# Patient Record
Sex: FEMALE | Race: WHITE | NOT HISPANIC OR LATINO | Employment: PART TIME | URBAN - METROPOLITAN AREA
[De-identification: names, ages, dates, MRNs, and addresses within clinical notes are randomized per-mention and may not be internally consistent; named-entity substitution may affect disease eponyms.]

---

## 2021-11-02 ENCOUNTER — TELEPHONE (OUTPATIENT)
Dept: SCHEDULING | Facility: IMAGING CENTER | Age: 51
End: 2021-11-02

## 2021-11-11 ENCOUNTER — HOSPITAL ENCOUNTER (OUTPATIENT)
Dept: RADIOLOGY | Facility: MEDICAL CENTER | Age: 51
End: 2021-11-11
Payer: OTHER GOVERNMENT

## 2021-11-19 ENCOUNTER — OFFICE VISIT (OUTPATIENT)
Dept: MEDICAL GROUP | Facility: MEDICAL CENTER | Age: 51
End: 2021-11-19
Payer: OTHER GOVERNMENT

## 2021-11-19 VITALS
HEART RATE: 76 BPM | BODY MASS INDEX: 28.76 KG/M2 | OXYGEN SATURATION: 98 % | TEMPERATURE: 97.8 F | WEIGHT: 194.2 LBS | HEIGHT: 69 IN | DIASTOLIC BLOOD PRESSURE: 80 MMHG | SYSTOLIC BLOOD PRESSURE: 128 MMHG | RESPIRATION RATE: 16 BRPM

## 2021-11-19 DIAGNOSIS — Z90.710 H/O: HYSTERECTOMY: ICD-10-CM

## 2021-11-19 DIAGNOSIS — Z00.00 PREVENTATIVE HEALTH CARE: ICD-10-CM

## 2021-11-19 DIAGNOSIS — Z12.11 COLON CANCER SCREENING: ICD-10-CM

## 2021-11-19 DIAGNOSIS — N95.1 MENOPAUSAL SYNDROME (HOT FLASHES): ICD-10-CM

## 2021-11-19 DIAGNOSIS — Z12.31 ENCOUNTER FOR SCREENING MAMMOGRAM FOR MALIGNANT NEOPLASM OF BREAST: ICD-10-CM

## 2021-11-19 DIAGNOSIS — R23.2 HOT FLASHES: ICD-10-CM

## 2021-11-19 PROCEDURE — 99214 OFFICE O/P EST MOD 30 MIN: CPT | Performed by: PHYSICIAN ASSISTANT

## 2021-11-19 RX ORDER — ESTRADIOL 0.03 MG/D
1 FILM, EXTENDED RELEASE TRANSDERMAL
Qty: 8 PATCH | Refills: 1 | Status: SHIPPED | OUTPATIENT
Start: 2021-11-19 | End: 2022-01-18 | Stop reason: SDUPTHER

## 2021-11-19 ASSESSMENT — PATIENT HEALTH QUESTIONNAIRE - PHQ9: CLINICAL INTERPRETATION OF PHQ2 SCORE: 0

## 2021-11-19 NOTE — PROGRESS NOTES
"Chief Complaint   Patient presents with   • Establish Care   • Sweats     \"night\"still hormones        HPI  Elva Asher is a 51 y.o. female here today for establishing care.    Patient has a hysterectomy at age of 42  due to fibroids, and has lost one ovary due to ovarian torsion.  Started having vaginal dryness and hot flashes about a year ago.  Has tried Premarin at some point however did not like it due to it being messy for patient.  States hot flashes are not improving after a year, wakes her up almost every night drenched in sweat.            Exam:  /80 (BP Location: Left arm, Patient Position: Sitting)   Pulse 76   Temp 36.6 °C (97.8 °F) (Temporal)   Resp 16   Ht 1.753 m (5' 9\")   Wt 88.1 kg (194 lb 3.2 oz)   SpO2 98%       Constitutional: Alert, oriented in no acute distress.  Psych: Eye contact is good, speech goal directed, affect calm  Eyes: Conjunctiva non-injected, sclera non-icteric.  Lungs: Unlabored respiratory effort, clear to auscultation bilaterally with good excursion, no wheez or rhonci  CV: regular rate and rhythm. No lower extremity edema  Ears:  External ears unremarkable. TMs pearly gray, clear and intact, w/o any perforation or effusion.  Neck subtle, thyroid midline without any nodules, no cervical lymphadenopathy      A/P:  1. H/O: hysterectomy      2. Preventative health care    - CBC WITH DIFFERENTIAL; Future  - Comp Metabolic Panel; Future  - Lipid Profile; Future  - TSH WITH REFLEX TO FT4; Future  - VITAMIN D,25 HYDROXY; Future    3. Encounter for screening mammogram for malignant neoplasm of breast    - MA-SCREENING MAMMO BILAT W/CAD; Future    4. Colon cancer screening    - Referral to GI for Colonoscopy    5. Hot flashes      6. Menopausal syndrome (hot flashes)  - Estradiol 0.025 MG/24HR PATCH BIWEEKLY; Place 1 Patch on the skin two times a week.  Dispense: 8 Patch; Refill: 1        F/U: 8 wks labs and HRT  "

## 2021-12-07 ENCOUNTER — HOSPITAL ENCOUNTER (OUTPATIENT)
Dept: LAB | Facility: MEDICAL CENTER | Age: 51
End: 2021-12-07
Attending: PHYSICIAN ASSISTANT
Payer: OTHER GOVERNMENT

## 2021-12-07 DIAGNOSIS — Z00.00 PREVENTATIVE HEALTH CARE: ICD-10-CM

## 2021-12-07 LAB
ALBUMIN SERPL BCP-MCNC: 4.6 G/DL (ref 3.2–4.9)
ALBUMIN/GLOB SERPL: 2.7 G/DL
ALP SERPL-CCNC: 55 U/L (ref 30–99)
ALT SERPL-CCNC: 17 U/L (ref 2–50)
ANION GAP SERPL CALC-SCNC: 10 MMOL/L (ref 7–16)
AST SERPL-CCNC: 18 U/L (ref 12–45)
BASOPHILS # BLD AUTO: 1.7 % (ref 0–1.8)
BASOPHILS # BLD: 0.07 K/UL (ref 0–0.12)
BILIRUB SERPL-MCNC: 0.5 MG/DL (ref 0.1–1.5)
BUN SERPL-MCNC: 12 MG/DL (ref 8–22)
CALCIUM SERPL-MCNC: 9.5 MG/DL (ref 8.5–10.5)
CHLORIDE SERPL-SCNC: 106 MMOL/L (ref 96–112)
CHOLEST SERPL-MCNC: 222 MG/DL (ref 100–199)
CO2 SERPL-SCNC: 24 MMOL/L (ref 20–33)
CREAT SERPL-MCNC: 0.65 MG/DL (ref 0.5–1.4)
EOSINOPHIL # BLD AUTO: 0.18 K/UL (ref 0–0.51)
EOSINOPHIL NFR BLD: 4.4 % (ref 0–6.9)
ERYTHROCYTE [DISTWIDTH] IN BLOOD BY AUTOMATED COUNT: 43.2 FL (ref 35.9–50)
FASTING STATUS PATIENT QL REPORTED: NORMAL
GLOBULIN SER CALC-MCNC: 1.7 G/DL (ref 1.9–3.5)
GLUCOSE SERPL-MCNC: 99 MG/DL (ref 65–99)
HCT VFR BLD AUTO: 44.9 % (ref 37–47)
HDLC SERPL-MCNC: 55 MG/DL
HGB BLD-MCNC: 15.1 G/DL (ref 12–16)
IMM GRANULOCYTES # BLD AUTO: 0.01 K/UL (ref 0–0.11)
IMM GRANULOCYTES NFR BLD AUTO: 0.2 % (ref 0–0.9)
LDLC SERPL CALC-MCNC: 139 MG/DL
LYMPHOCYTES # BLD AUTO: 1.77 K/UL (ref 1–4.8)
LYMPHOCYTES NFR BLD: 43.3 % (ref 22–41)
MCH RBC QN AUTO: 32 PG (ref 27–33)
MCHC RBC AUTO-ENTMCNC: 33.6 G/DL (ref 33.6–35)
MCV RBC AUTO: 95.1 FL (ref 81.4–97.8)
MONOCYTES # BLD AUTO: 0.25 K/UL (ref 0–0.85)
MONOCYTES NFR BLD AUTO: 6.1 % (ref 0–13.4)
NEUTROPHILS # BLD AUTO: 1.81 K/UL (ref 2–7.15)
NEUTROPHILS NFR BLD: 44.3 % (ref 44–72)
NRBC # BLD AUTO: 0 K/UL
NRBC BLD-RTO: 0 /100 WBC
PLATELET # BLD AUTO: 238 K/UL (ref 164–446)
PMV BLD AUTO: 10.9 FL (ref 9–12.9)
POTASSIUM SERPL-SCNC: 4.8 MMOL/L (ref 3.6–5.5)
PROT SERPL-MCNC: 6.3 G/DL (ref 6–8.2)
RBC # BLD AUTO: 4.72 M/UL (ref 4.2–5.4)
SODIUM SERPL-SCNC: 140 MMOL/L (ref 135–145)
TRIGL SERPL-MCNC: 138 MG/DL (ref 0–149)
TSH SERPL DL<=0.005 MIU/L-ACNC: 2.5 UIU/ML (ref 0.38–5.33)
WBC # BLD AUTO: 4.1 K/UL (ref 4.8–10.8)

## 2021-12-07 PROCEDURE — 82306 VITAMIN D 25 HYDROXY: CPT

## 2021-12-07 PROCEDURE — 80061 LIPID PANEL: CPT

## 2021-12-07 PROCEDURE — 80053 COMPREHEN METABOLIC PANEL: CPT

## 2021-12-07 PROCEDURE — 36415 COLL VENOUS BLD VENIPUNCTURE: CPT

## 2021-12-07 PROCEDURE — 85025 COMPLETE CBC W/AUTO DIFF WBC: CPT

## 2021-12-07 PROCEDURE — 84443 ASSAY THYROID STIM HORMONE: CPT

## 2021-12-10 LAB — 25(OH)D3 SERPL-MCNC: 23 NG/ML (ref 30–80)

## 2022-01-07 ENCOUNTER — HOSPITAL ENCOUNTER (OUTPATIENT)
Dept: RADIOLOGY | Facility: MEDICAL CENTER | Age: 52
End: 2022-01-07
Attending: PHYSICIAN ASSISTANT
Payer: OTHER GOVERNMENT

## 2022-01-07 DIAGNOSIS — Z12.31 VISIT FOR SCREENING MAMMOGRAM: ICD-10-CM

## 2022-01-07 PROCEDURE — 77063 BREAST TOMOSYNTHESIS BI: CPT

## 2022-01-18 ENCOUNTER — TELEMEDICINE (OUTPATIENT)
Dept: MEDICAL GROUP | Facility: MEDICAL CENTER | Age: 52
End: 2022-01-18
Payer: OTHER GOVERNMENT

## 2022-01-18 ENCOUNTER — HOSPITAL ENCOUNTER (OUTPATIENT)
Facility: MEDICAL CENTER | Age: 52
End: 2022-01-18
Attending: PHYSICIAN ASSISTANT
Payer: OTHER GOVERNMENT

## 2022-01-18 VITALS — HEIGHT: 69 IN | BODY MASS INDEX: 28.58 KG/M2 | WEIGHT: 193 LBS

## 2022-01-18 DIAGNOSIS — R09.81 CONGESTION OF NASAL SINUS: ICD-10-CM

## 2022-01-18 DIAGNOSIS — E55.9 VITAMIN D INSUFFICIENCY: ICD-10-CM

## 2022-01-18 DIAGNOSIS — Z20.822 EXPOSURE TO CONFIRMED CASE OF COVID-19: ICD-10-CM

## 2022-01-18 DIAGNOSIS — N95.1 MENOPAUSAL SYNDROME (HOT FLASHES): ICD-10-CM

## 2022-01-18 PROCEDURE — 99214 OFFICE O/P EST MOD 30 MIN: CPT | Mod: CS | Performed by: PHYSICIAN ASSISTANT

## 2022-01-18 PROCEDURE — U0003 INFECTIOUS AGENT DETECTION BY NUCLEIC ACID (DNA OR RNA); SEVERE ACUTE RESPIRATORY SYNDROME CORONAVIRUS 2 (SARS-COV-2) (CORONAVIRUS DISEASE [COVID-19]), AMPLIFIED PROBE TECHNIQUE, MAKING USE OF HIGH THROUGHPUT TECHNOLOGIES AS DESCRIBED BY CMS-2020-01-R: HCPCS

## 2022-01-18 PROCEDURE — U0005 INFEC AGEN DETEC AMPLI PROBE: HCPCS

## 2022-01-18 RX ORDER — ESTRADIOL 0.03 MG/D
1 FILM, EXTENDED RELEASE TRANSDERMAL
Qty: 24 PATCH | Refills: 3 | Status: SHIPPED | OUTPATIENT
Start: 2022-01-18 | End: 2023-01-10 | Stop reason: SDUPTHER

## 2022-01-18 ASSESSMENT — FIBROSIS 4 INDEX: FIB4 SCORE: 0.9

## 2022-01-18 NOTE — PROGRESS NOTES
"This visit was conducted utilizing secure and encrypted videoconferencing equipment, with the patient's consent.    Patient's identity was verified.          Chief Complaint   Patient presents with   • Lab Results   • Coronavirus Screening       HPI:     Elva Asher is a 51 y.o. female. Patient is presenting to discuss:     tested pos for COVID 3 days ago, only had congestion.   She started w some congestion couple days ago but it is clearing. pos mild cough, No fever,  pt is vaccinated and  Booster    Patient has started estradiol patches, states vaginal dryness and hot flashes has improved.  Mammogram is up-to-date, normal, she has had hysterectomy. No Se     Last lab results were reviewed by me today and discussed w patient.      Past medical, surgical, family, and social history is reviewed in Epic chart by me today.   Medications and allergies reviewed and updated in Epic chart by me today.          Objective:     Ht 1.753 m (5' 9\")   Wt 87.5 kg (193 lb)  Body mass index is 28.5 kg/m².   Physical Exam:  Pain: sounds comfortable   Constitutional: Alert, no distress.  Eye: pupils Equal, conjunctiva clear,   Respiratory: Unlabored respiratory effort, speaking in full sentences, no audible wheezes.           Assessment and Plan:   The following treatment plan was discussed        1. Menopausal syndrome (hot flashes)    - Estradiol 0.025 MG/24HR PATCH BIWEEKLY; Place 1 Patch on the skin two times a week.  Dispense: 24 Patch; Refill: 3    2. Exposure to confirmed case of COVID-19    - SARS-CoV-2, PCR (In-House); Future    3. Congestion of nasal sinus    - SARS-CoV-2, PCR (In-House); Future    4. Vit D insufficiency  start over-the-counter vitamin D 1000 international units daily.      Discussed LDL and lymphocytes slightly elevated, no concerning at this point         F/U prn              "

## 2022-01-19 DIAGNOSIS — R09.81 CONGESTION OF NASAL SINUS: ICD-10-CM

## 2022-01-19 DIAGNOSIS — Z20.822 EXPOSURE TO CONFIRMED CASE OF COVID-19: ICD-10-CM

## 2022-01-19 LAB — AMBIGUOUS DTTM AMBI4: NORMAL

## 2022-01-20 LAB
COVID ORDER STATUS COVID19: NORMAL
SARS-COV-2 RNA RESP QL NAA+PROBE: DETECTED
SPECIMEN SOURCE: ABNORMAL

## 2023-01-10 DIAGNOSIS — N95.1 MENOPAUSAL SYNDROME (HOT FLASHES): ICD-10-CM

## 2023-01-11 RX ORDER — ESTRADIOL 0.03 MG/D
1 FILM, EXTENDED RELEASE TRANSDERMAL
Qty: 24 PATCH | Refills: 0 | Status: SHIPPED | OUTPATIENT
Start: 2023-01-11 | End: 2023-01-31 | Stop reason: SDUPTHER

## 2023-01-11 NOTE — TELEPHONE ENCOUNTER
Future Appointments         Provider Department Center    1/20/2023 3:10 PM (Arrive by 2:55 PM) CARROL GARCIA MG 1 [facility] Mammography [facility]    1/31/2023 10:00 AM (Arrive by 9:45 AM) Alona Brooks P.A.-C. [facility]

## 2023-01-20 ENCOUNTER — HOSPITAL ENCOUNTER (OUTPATIENT)
Dept: RADIOLOGY | Facility: MEDICAL CENTER | Age: 53
End: 2023-01-20
Attending: PHYSICIAN ASSISTANT
Payer: OTHER GOVERNMENT

## 2023-01-20 DIAGNOSIS — Z12.31 VISIT FOR SCREENING MAMMOGRAM: ICD-10-CM

## 2023-01-20 PROCEDURE — 77063 BREAST TOMOSYNTHESIS BI: CPT

## 2023-01-31 ENCOUNTER — OFFICE VISIT (OUTPATIENT)
Dept: MEDICAL GROUP | Facility: MEDICAL CENTER | Age: 53
End: 2023-01-31
Payer: OTHER GOVERNMENT

## 2023-01-31 VITALS
SYSTOLIC BLOOD PRESSURE: 128 MMHG | WEIGHT: 194.56 LBS | OXYGEN SATURATION: 98 % | TEMPERATURE: 97 F | HEART RATE: 76 BPM | BODY MASS INDEX: 28.82 KG/M2 | DIASTOLIC BLOOD PRESSURE: 78 MMHG | RESPIRATION RATE: 16 BRPM | HEIGHT: 69 IN

## 2023-01-31 DIAGNOSIS — Z12.83 SKIN CANCER SCREENING: ICD-10-CM

## 2023-01-31 DIAGNOSIS — Z00.00 PREVENTATIVE HEALTH CARE: ICD-10-CM

## 2023-01-31 DIAGNOSIS — Z00.00 ANNUAL PHYSICAL EXAM: ICD-10-CM

## 2023-01-31 DIAGNOSIS — N95.1 MENOPAUSAL SYNDROME (HOT FLASHES): ICD-10-CM

## 2023-01-31 PROCEDURE — 99396 PREV VISIT EST AGE 40-64: CPT | Performed by: PHYSICIAN ASSISTANT

## 2023-01-31 RX ORDER — ESTRADIOL 0.03 MG/D
1 FILM, EXTENDED RELEASE TRANSDERMAL
Qty: 24 PATCH | Refills: 0 | Status: SHIPPED | OUTPATIENT
Start: 2023-01-31 | End: 2023-05-16

## 2023-01-31 ASSESSMENT — PATIENT HEALTH QUESTIONNAIRE - PHQ9: CLINICAL INTERPRETATION OF PHQ2 SCORE: 0

## 2023-01-31 ASSESSMENT — FIBROSIS 4 INDEX: FIB4 SCORE: 0.94

## 2023-01-31 NOTE — PROGRESS NOTES
Subjective:     CC:   Chief Complaint   Patient presents with    Nevus     R Calf area       HPI:   Elva Asher is a 53 y.o. female who presents for annual exam    Patient is doing well.  States she has a lot of sun exposure as she was younger she used to do sunbathing.  Patient with history of hysterectomy, no menses, continues on HRT which has helped with hot flashes.  Diet has variety,    No breast tenderness, mass, nipple discharge, changes in size or contour, or abnormal cyclic discomfort.    She  has no past medical history on file.  She  has a past surgical history that includes abdominal hysterectomy total and cholecystectomy.    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 [employer]    Tobacco Use    Smoking status: Never    Smokeless tobacco: Never   Vaping Use    Vaping Use: Some days    Substances: THC   Substance and Sexual Activity    Alcohol use: Yes     Comment: Socially    Drug use: Yes     Frequency: 2.0 times per week     Types: Marijuana     Comment: Help sleep and stop night sweats    Sexual activity: Not Currently     Partners: Male     Birth control/protection: Abstinence, None, Post-Menopausal     Comment:   [place]   Other Topics Concern    Not on file   Social History Narrative    Not on file     Social Determinants of Health     Financial Resource Strain: Not on file   Food Insecurity: Not on file   Transportation Needs: Not on file   Physical Activity: Not on file   Stress: Not on file   Social Connections: Not on file   Intimate Partner Violence: Not on file   Housing Stability: Not on file       Patient Active Problem List    Diagnosis Date Noted    H/O:  "hysterectomy 11/19/2021    Hot flashes 11/19/2021    Menopausal syndrome (hot flashes) 11/19/2021         Current Outpatient Medications   Medication Sig Dispense Refill    Estradiol 0.025 MG/24HR PATCH BIWEEKLY Place 1 Patch on the skin two times a week. 24 Patch 0    Multiple Vitamin (MULTI-VITAMIN DAILY PO) Take  by mouth.      Ascorbic Acid (VITAMIN C PO) Take  by mouth.      CALCIUM PO Take  by mouth.       No current facility-administered medications for this visit.     No Known Allergies    Review of Systems   Constitutional: Negative for fever, chills and malaise/fatigue.   HENT: Negative for congestion.    Eyes: Negative for pain.   Respiratory: Negative for cough and shortness of breath.    Cardiovascular: Negative for leg swelling.   Gastrointestinal: Negative for nausea, vomiting, abdominal pain and diarrhea.   Genitourinary: Negative for dysuria and hematuria.   Neurological: Negative for dizziness, focal weakness and headaches.   Endo/Heme/Allergies: Does not bruise/bleed easily.   Psychiatric/Behavioral: Negative for depression.  The patient is not nervous/anxious.      Objective:     /78 (BP Location: Left arm, Patient Position: Sitting)   Pulse 76   Temp 36.1 °C (97 °F) (Temporal)   Resp 16   Ht 1.753 m (5' 9\")   Wt 88.3 kg (194 lb 8.9 oz)   SpO2 98%   BMI 28.73 kg/m²   Body mass index is 28.73 kg/m².  Wt Readings from Last 4 Encounters:   01/31/23 88.3 kg (194 lb 8.9 oz)   01/18/22 87.5 kg (193 lb)   11/19/21 88.1 kg (194 lb 3.2 oz)       Physical Exam:  Constitutional: Well-developed and well-nourished. No distress.   Skin: Skin is warm and dry. No rashes in visible areas.  Nail: w/o pitting, ridging or onychomycosis   Head: Atraumatic without lesions.  Eyes: Equal, round and reactive, conjunctiva clear,sclera non icteric, lids normal.  Ears:  External ears unremarkable. Tympanic membranes clear and intact.  Neck: Supple, trachea midline.  No thyromegaly present. No " lymphadenopathy--cervical or supraclavicular  Cardiovascular: Regular rate and rhythm, without any murmurs.  No lower extremity edema. Cap refill < 3sec  Lung:  Clear to auscultation throughout w/o any wheeze or rhonchi. No adventitious sounds.    Abdomen: Soft, non tender, and without distention. Active bowel sounds in all four quadrants. No rebound, guarding, masses or HSM. No CVA tenderness  Musculoskeletal: All major joints without pain or swelling  Neurological: speech normal, mental status intact, gait grossly normal  Psychiatric:   Alert and oriented x3, normal affect and mood and behavior      Assessment and Plan:     1. Preventative health care  CBC WITH DIFFERENTIAL    Comp Metabolic Panel    Lipid Profile    TSH WITH REFLEX TO FT4    VITAMIN D,25 HYDROXY (DEFICIENCY)      2. Skin cancer screening        3. Menopausal syndrome (hot flashes)  Estradiol 0.025 MG/24HR PATCH BIWEEKLY      4. Annual physical exam          Discussed:  regular exercise   healthy diet  Sun protection w SPF  Dental visit, brushing flossing   Skin cancer screening    Colonoscopy: up to date  Mammogram: up to date  Last lab results were reviewed by me today        Follow-up: Return if symptoms worsen or fail to improve.

## 2023-03-21 ENCOUNTER — HOSPITAL ENCOUNTER (OUTPATIENT)
Dept: LAB | Facility: MEDICAL CENTER | Age: 53
End: 2023-03-21
Attending: PHYSICIAN ASSISTANT
Payer: OTHER GOVERNMENT

## 2023-03-21 DIAGNOSIS — Z00.00 PREVENTATIVE HEALTH CARE: ICD-10-CM

## 2023-03-21 LAB
25(OH)D3 SERPL-MCNC: 28 NG/ML (ref 30–100)
ALBUMIN SERPL BCP-MCNC: 4.3 G/DL (ref 3.2–4.9)
ALBUMIN/GLOB SERPL: 1.5 G/DL
ALP SERPL-CCNC: 60 U/L (ref 30–99)
ALT SERPL-CCNC: 16 U/L (ref 2–50)
ANION GAP SERPL CALC-SCNC: 10 MMOL/L (ref 7–16)
AST SERPL-CCNC: 16 U/L (ref 12–45)
BASOPHILS # BLD AUTO: 1.7 % (ref 0–1.8)
BASOPHILS # BLD: 0.1 K/UL (ref 0–0.12)
BILIRUB SERPL-MCNC: 0.3 MG/DL (ref 0.1–1.5)
BUN SERPL-MCNC: 13 MG/DL (ref 8–22)
CALCIUM ALBUM COR SERPL-MCNC: 9.2 MG/DL (ref 8.5–10.5)
CALCIUM SERPL-MCNC: 9.4 MG/DL (ref 8.5–10.5)
CHLORIDE SERPL-SCNC: 105 MMOL/L (ref 96–112)
CHOLEST SERPL-MCNC: 254 MG/DL (ref 100–199)
CO2 SERPL-SCNC: 24 MMOL/L (ref 20–33)
CREAT SERPL-MCNC: 0.79 MG/DL (ref 0.5–1.4)
EOSINOPHIL # BLD AUTO: 0.31 K/UL (ref 0–0.51)
EOSINOPHIL NFR BLD: 5.3 % (ref 0–6.9)
ERYTHROCYTE [DISTWIDTH] IN BLOOD BY AUTOMATED COUNT: 43.1 FL (ref 35.9–50)
FASTING STATUS PATIENT QL REPORTED: NORMAL
GFR SERPLBLD CREATININE-BSD FMLA CKD-EPI: 90 ML/MIN/1.73 M 2
GLOBULIN SER CALC-MCNC: 2.8 G/DL (ref 1.9–3.5)
GLUCOSE SERPL-MCNC: 88 MG/DL (ref 65–99)
HCT VFR BLD AUTO: 45.2 % (ref 37–47)
HDLC SERPL-MCNC: 51 MG/DL
HGB BLD-MCNC: 14.7 G/DL (ref 12–16)
IMM GRANULOCYTES # BLD AUTO: 0.01 K/UL (ref 0–0.11)
IMM GRANULOCYTES NFR BLD AUTO: 0.2 % (ref 0–0.9)
LDLC SERPL CALC-MCNC: 174 MG/DL
LYMPHOCYTES # BLD AUTO: 2.73 K/UL (ref 1–4.8)
LYMPHOCYTES NFR BLD: 46.7 % (ref 22–41)
MCH RBC QN AUTO: 31.1 PG (ref 27–33)
MCHC RBC AUTO-ENTMCNC: 32.5 G/DL (ref 33.6–35)
MCV RBC AUTO: 95.8 FL (ref 81.4–97.8)
MONOCYTES # BLD AUTO: 0.39 K/UL (ref 0–0.85)
MONOCYTES NFR BLD AUTO: 6.7 % (ref 0–13.4)
NEUTROPHILS # BLD AUTO: 2.3 K/UL (ref 2–7.15)
NEUTROPHILS NFR BLD: 39.4 % (ref 44–72)
NRBC # BLD AUTO: 0 K/UL
NRBC BLD-RTO: 0 /100 WBC
PLATELET # BLD AUTO: 280 K/UL (ref 164–446)
PMV BLD AUTO: 10.8 FL (ref 9–12.9)
POTASSIUM SERPL-SCNC: 4.3 MMOL/L (ref 3.6–5.5)
PROT SERPL-MCNC: 7.1 G/DL (ref 6–8.2)
RBC # BLD AUTO: 4.72 M/UL (ref 4.2–5.4)
SODIUM SERPL-SCNC: 139 MMOL/L (ref 135–145)
TRIGL SERPL-MCNC: 143 MG/DL (ref 0–149)
TSH SERPL DL<=0.005 MIU/L-ACNC: 4.91 UIU/ML (ref 0.38–5.33)
WBC # BLD AUTO: 5.8 K/UL (ref 4.8–10.8)

## 2023-03-21 PROCEDURE — 80061 LIPID PANEL: CPT

## 2023-03-21 PROCEDURE — 36415 COLL VENOUS BLD VENIPUNCTURE: CPT

## 2023-03-21 PROCEDURE — 84443 ASSAY THYROID STIM HORMONE: CPT

## 2023-03-21 PROCEDURE — 82306 VITAMIN D 25 HYDROXY: CPT

## 2023-03-21 PROCEDURE — 85025 COMPLETE CBC W/AUTO DIFF WBC: CPT

## 2023-03-21 PROCEDURE — 80053 COMPREHEN METABOLIC PANEL: CPT

## 2023-05-13 DIAGNOSIS — N95.1 MENOPAUSAL SYNDROME (HOT FLASHES): ICD-10-CM

## 2023-05-16 RX ORDER — ESTRADIOL 0.03 MG/D
PATCH, EXTENDED RELEASE TRANSDERMAL
Qty: 24 PATCH | Refills: 0 | Status: SHIPPED | OUTPATIENT
Start: 2023-05-16

## 2024-03-28 ENCOUNTER — HOSPITAL ENCOUNTER (OUTPATIENT)
Dept: RADIOLOGY | Facility: MEDICAL CENTER | Age: 54
End: 2024-03-28
Attending: PHYSICIAN ASSISTANT
Payer: OTHER GOVERNMENT

## 2024-03-28 DIAGNOSIS — Z12.31 VISIT FOR SCREENING MAMMOGRAM: ICD-10-CM

## 2024-03-28 PROCEDURE — 77067 SCR MAMMO BI INCL CAD: CPT

## 2024-12-18 ENCOUNTER — HOSPITAL ENCOUNTER (OUTPATIENT)
Facility: MEDICAL CENTER | Age: 54
End: 2024-12-18
Attending: STUDENT IN AN ORGANIZED HEALTH CARE EDUCATION/TRAINING PROGRAM
Payer: OTHER GOVERNMENT

## 2024-12-18 ENCOUNTER — APPOINTMENT (OUTPATIENT)
Dept: MEDICAL GROUP | Facility: PHYSICIAN GROUP | Age: 54
End: 2024-12-18
Payer: OTHER GOVERNMENT

## 2024-12-18 VITALS
DIASTOLIC BLOOD PRESSURE: 68 MMHG | HEIGHT: 69 IN | TEMPERATURE: 98.1 F | SYSTOLIC BLOOD PRESSURE: 104 MMHG | WEIGHT: 200.84 LBS | BODY MASS INDEX: 29.75 KG/M2 | OXYGEN SATURATION: 98 % | HEART RATE: 77 BPM

## 2024-12-18 DIAGNOSIS — N89.8 VAGINAL DISCHARGE: ICD-10-CM

## 2024-12-18 DIAGNOSIS — N95.2 ATROPHIC VAGINITIS: ICD-10-CM

## 2024-12-18 LAB
CANDIDA DNA VAG QL PROBE+SIG AMP: NEGATIVE
G VAGINALIS DNA VAG QL PROBE+SIG AMP: NEGATIVE
T VAGINALIS DNA VAG QL PROBE+SIG AMP: NEGATIVE

## 2024-12-18 PROCEDURE — 87510 GARDNER VAG DNA DIR PROBE: CPT

## 2024-12-18 PROCEDURE — 99214 OFFICE O/P EST MOD 30 MIN: CPT | Performed by: STUDENT IN AN ORGANIZED HEALTH CARE EDUCATION/TRAINING PROGRAM

## 2024-12-18 PROCEDURE — 3078F DIAST BP <80 MM HG: CPT | Performed by: STUDENT IN AN ORGANIZED HEALTH CARE EDUCATION/TRAINING PROGRAM

## 2024-12-18 PROCEDURE — 87660 TRICHOMONAS VAGIN DIR PROBE: CPT

## 2024-12-18 PROCEDURE — 87480 CANDIDA DNA DIR PROBE: CPT

## 2024-12-18 PROCEDURE — 3074F SYST BP LT 130 MM HG: CPT | Performed by: STUDENT IN AN ORGANIZED HEALTH CARE EDUCATION/TRAINING PROGRAM

## 2024-12-18 PROCEDURE — 99459 PELVIC EXAMINATION: CPT | Performed by: STUDENT IN AN ORGANIZED HEALTH CARE EDUCATION/TRAINING PROGRAM

## 2024-12-18 RX ORDER — CONJUGATED ESTROGENS 0.62 MG/G
0.5 CREAM VAGINAL DAILY
Qty: 30 G | Refills: 1 | Status: SHIPPED
Start: 2024-12-18 | End: 2024-12-18

## 2024-12-18 RX ORDER — ESTRADIOL 0.1 MG/G
CREAM VAGINAL
Qty: 42.5 G | Refills: 0 | Status: SHIPPED | OUTPATIENT
Start: 2024-12-18

## 2024-12-18 ASSESSMENT — FIBROSIS 4 INDEX: FIB4 SCORE: .7428571428571428571

## 2024-12-18 NOTE — PROGRESS NOTES
"Subjective:     Chief Complaint   Patient presents with    Pain     Pain feeling like something in there.       History of Present Illness  The patient presents for evaluation of vaginal discomfort.    She reports a sensation akin to that of a saturated tampon. She performed self-examination with a mirror but has not observed any abnormal bulging. She has experienced hot flashes, which have lessened in severity with the onset of cooler weather. She discontinued the use of the [place] patch due to concerns about potential side effects. She has also noticed some vaginal dryness and has attempted to alleviate this with OTC moisturizers. She has a history of two vaginal deliveries. History of hysterectomy at age 42 due to fibroids and she lost 1 ovary due to ovarian torsion. She was seen Nov 2021 for menopausal symptoms such as hot flashes and vaginal dryness. She has previously experienced bacterial vaginosis (BV) and had an allergic reaction to MetroGel.     ALLERGIES  The patient is allergic to METROGEL.        ROS:  Negative except as stated above.      Objective:     Exam:  /68   Pulse 77   Temp 36.7 °C (98.1 °F) (Temporal)   Ht 1.753 m (5' 9\")   Wt 91.1 kg (200 lb 13.4 oz)   SpO2 98%   BMI 29.66 kg/m²  Body mass index is 29.66 kg/m².    Physical Exam    : Perineum and external genitalia normal without rash. Vagina with copious white discharge. Cervix without visible lesions or discharge. No visible prolapse.    A chaperone was offered to the patient during today's exam. Chaperone name: Melba was present.      Assessment & Plan:     54 y.o. female with the following -     1. Atrophic vaginitis  Chronic with acute exacerbation.  The patient reports a sensation of vaginal dryness and discomfort, similar to a saturated tampon.  She has a history of hysterectomy and menopause.  A pelvic examination revealed no signs of prolapse or bulges but noted a white discharge.  A swab was taken to rule out yeast " infection or bacterial vaginosis.  She will be contacted with the results and treated if necessary.  A prescription for vaginal estrogen has been issued, with instructions to apply it daily for 2 weeks, followed by a reduction to 2 times weekly, depending on her response.  Side effects of medication were discussed and no indication to restart [place] patch at this time.  - estrogens, conjugated (PREMARIN) 0.625 MG/GM Cream; Insert 0.5 g into the vagina every day. After 2 weeks decrease to twice weekly.  Dispense: 30 g; Refill: 1    2. Vaginal discharge  - VAGINAL PATHOGENS DNA PANEL; Future        Return in about 2 months (around 2/18/2025) for establish care.    Verbal consent was acquired by the patient to use [employer] ambient listening note generation during this visit: Yes.    Please note that this dictation was created using voice recognition software. I have made every reasonable attempt to correct obvious errors, but I expect that there are errors of grammar and possibly content that I did not discover before finalizing the note.

## 2025-01-14 DIAGNOSIS — N95.2 ATROPHIC VAGINITIS: ICD-10-CM

## 2025-01-15 NOTE — TELEPHONE ENCOUNTER
Received request via: Patient    Was the patient seen in the last year in this department? Yes    Does the patient have an active prescription (recently filled or refills available) for medication(s) requested? No    [email or web address]    Does the patient have skilled nursing Plus and need 100-day supply? (This applies to ALL medications) Patient does not have SCP

## 2025-01-16 RX ORDER — ESTRADIOL 0.1 MG/G
CREAM VAGINAL
Qty: 42.5 G | Refills: 0 | Status: SHIPPED | OUTPATIENT
Start: 2025-01-16 | End: 2025-01-20 | Stop reason: SDUPTHER

## 2025-01-23 RX ORDER — ESTRADIOL 0.1 MG/G
CREAM VAGINAL
Qty: 42.5 G | Refills: 0 | Status: SHIPPED | OUTPATIENT
Start: 2025-01-23 | End: 2025-01-30 | Stop reason: SDUPTHER

## 2025-01-29 SDOH — ECONOMIC STABILITY: TRANSPORTATION INSECURITY
IN THE PAST 12 MONTHS, HAS THE LACK OF TRANSPORTATION KEPT YOU FROM MEDICAL APPOINTMENTS OR FROM GETTING MEDICATIONS?: NO

## 2025-01-29 SDOH — ECONOMIC STABILITY: TRANSPORTATION INSECURITY
IN THE PAST 12 MONTHS, HAS LACK OF TRANSPORTATION KEPT YOU FROM MEETINGS, WORK, OR FROM GETTING THINGS NEEDED FOR DAILY LIVING?: NO

## 2025-01-29 SDOH — HEALTH STABILITY: PHYSICAL HEALTH: ON AVERAGE, HOW MANY MINUTES DO YOU ENGAGE IN EXERCISE AT THIS LEVEL?: 40 MIN

## 2025-01-29 SDOH — HEALTH STABILITY: PHYSICAL HEALTH: ON AVERAGE, HOW MANY DAYS PER WEEK DO YOU ENGAGE IN MODERATE TO STRENUOUS EXERCISE (LIKE A BRISK WALK)?: 3 DAYS

## 2025-01-29 SDOH — ECONOMIC STABILITY: FOOD INSECURITY: WITHIN THE PAST 12 MONTHS, THE FOOD YOU BOUGHT JUST DIDN'T LAST AND YOU DIDN'T HAVE MONEY TO GET MORE.: NEVER TRUE

## 2025-01-29 SDOH — ECONOMIC STABILITY: FOOD INSECURITY: WITHIN THE PAST 12 MONTHS, YOU WORRIED THAT YOUR FOOD WOULD RUN OUT BEFORE YOU GOT MONEY TO BUY MORE.: NEVER TRUE

## 2025-01-29 SDOH — ECONOMIC STABILITY: INCOME INSECURITY: IN THE LAST 12 MONTHS, WAS THERE A TIME WHEN YOU WERE NOT ABLE TO PAY THE MORTGAGE OR RENT ON TIME?: NO

## 2025-01-29 SDOH — ECONOMIC STABILITY: INCOME INSECURITY: HOW HARD IS IT FOR YOU TO PAY FOR THE VERY BASICS LIKE FOOD, HOUSING, MEDICAL CARE, AND HEATING?: NOT VERY HARD

## 2025-01-29 SDOH — HEALTH STABILITY: MENTAL HEALTH
STRESS IS WHEN SOMEONE FEELS TENSE, NERVOUS, ANXIOUS, OR CAN'T SLEEP AT NIGHT BECAUSE THEIR MIND IS TROUBLED. HOW STRESSED ARE YOU?: ONLY A LITTLE

## 2025-01-29 SDOH — ECONOMIC STABILITY: HOUSING INSECURITY
IN THE LAST 12 MONTHS, WAS THERE A TIME WHEN YOU DID NOT HAVE A STEADY PLACE TO SLEEP OR SLEPT IN A SHELTER (INCLUDING NOW)?: NO

## 2025-01-29 SDOH — ECONOMIC STABILITY: TRANSPORTATION INSECURITY
IN THE PAST 12 MONTHS, HAS LACK OF RELIABLE TRANSPORTATION KEPT YOU FROM MEDICAL APPOINTMENTS, MEETINGS, WORK OR FROM GETTING THINGS NEEDED FOR DAILY LIVING?: NO

## 2025-01-29 ASSESSMENT — LIFESTYLE VARIABLES
HOW OFTEN DO YOU HAVE A DRINK CONTAINING ALCOHOL: MONTHLY OR LESS
SKIP TO QUESTIONS 9-10: 1
HOW MANY STANDARD DRINKS CONTAINING ALCOHOL DO YOU HAVE ON A TYPICAL DAY: 1 OR 2
AUDIT-C TOTAL SCORE: 1
HOW OFTEN DO YOU HAVE SIX OR MORE DRINKS ON ONE OCCASION: NEVER

## 2025-01-29 ASSESSMENT — SOCIAL DETERMINANTS OF HEALTH (SDOH)
HOW HARD IS IT FOR YOU TO PAY FOR THE VERY BASICS LIKE FOOD, HOUSING, MEDICAL CARE, AND HEATING?: NOT VERY HARD
IN A TYPICAL WEEK, HOW MANY TIMES DO YOU TALK ON THE PHONE WITH FAMILY, FRIENDS, OR NEIGHBORS?: TWICE A WEEK
WITHIN THE PAST 12 MONTHS, YOU WORRIED THAT YOUR FOOD WOULD RUN OUT BEFORE YOU GOT THE MONEY TO BUY MORE: NEVER TRUE
HOW OFTEN DO YOU HAVE A DRINK CONTAINING ALCOHOL: MONTHLY OR LESS
HOW OFTEN DO YOU ATTEND CHURCH OR RELIGIOUS SERVICES?: NEVER
HOW OFTEN DO YOU GET TOGETHER WITH FRIENDS OR RELATIVES?: PATIENT DECLINED
DO YOU BELONG TO ANY CLUBS OR ORGANIZATIONS SUCH AS CHURCH GROUPS UNIONS, FRATERNAL OR ATHLETIC GROUPS, OR SCHOOL GROUPS?: NO
HOW MANY DRINKS CONTAINING ALCOHOL DO YOU HAVE ON A TYPICAL DAY WHEN YOU ARE DRINKING: 1 OR 2
HOW OFTEN DO YOU ATTENT MEETINGS OF THE CLUB OR ORGANIZATION YOU BELONG TO?: NEVER
DO YOU BELONG TO ANY CLUBS OR ORGANIZATIONS SUCH AS CHURCH GROUPS UNIONS, FRATERNAL OR ATHLETIC GROUPS, OR SCHOOL GROUPS?: NO
IN THE PAST 12 MONTHS, HAS THE ELECTRIC, GAS, OIL, OR WATER COMPANY THREATENED TO SHUT OFF SERVICE IN YOUR HOME?: NO
HOW OFTEN DO YOU HAVE SIX OR MORE DRINKS ON ONE OCCASION: NEVER
HOW OFTEN DO YOU GET TOGETHER WITH FRIENDS OR RELATIVES?: PATIENT DECLINED
HOW OFTEN DO YOU ATTEND CHURCH OR RELIGIOUS SERVICES?: NEVER
HOW OFTEN DO YOU ATTENT MEETINGS OF THE CLUB OR ORGANIZATION YOU BELONG TO?: NEVER
IN A TYPICAL WEEK, HOW MANY TIMES DO YOU TALK ON THE PHONE WITH FAMILY, FRIENDS, OR NEIGHBORS?: TWICE A WEEK

## 2025-01-30 ENCOUNTER — OFFICE VISIT (OUTPATIENT)
Dept: MEDICAL GROUP | Facility: PHYSICIAN GROUP | Age: 55
End: 2025-01-30
Payer: OTHER GOVERNMENT

## 2025-01-30 VITALS
HEART RATE: 81 BPM | BODY MASS INDEX: 30.1 KG/M2 | TEMPERATURE: 98.5 F | DIASTOLIC BLOOD PRESSURE: 64 MMHG | HEIGHT: 69 IN | WEIGHT: 203.2 LBS | OXYGEN SATURATION: 97 % | SYSTOLIC BLOOD PRESSURE: 98 MMHG

## 2025-01-30 DIAGNOSIS — Z80.8 FAMILY HISTORY OF MELANOMA: ICD-10-CM

## 2025-01-30 DIAGNOSIS — Z76.89 ENCOUNTER TO ESTABLISH CARE: ICD-10-CM

## 2025-01-30 DIAGNOSIS — Z11.59 NEED FOR HEPATITIS C SCREENING TEST: ICD-10-CM

## 2025-01-30 DIAGNOSIS — N95.2 ATROPHIC VAGINITIS: ICD-10-CM

## 2025-01-30 DIAGNOSIS — E55.9 VITAMIN D DEFICIENCY: ICD-10-CM

## 2025-01-30 DIAGNOSIS — R07.89 CHEST HEAVINESS: ICD-10-CM

## 2025-01-30 DIAGNOSIS — Z12.83 SKIN CANCER SCREENING: ICD-10-CM

## 2025-01-30 DIAGNOSIS — D70.9 NEUTROPENIA, UNSPECIFIED TYPE (HCC): ICD-10-CM

## 2025-01-30 DIAGNOSIS — B35.1 ONYCHOMYCOSIS: ICD-10-CM

## 2025-01-30 DIAGNOSIS — E78.5 DYSLIPIDEMIA: ICD-10-CM

## 2025-01-30 DIAGNOSIS — Z12.31 ENCOUNTER FOR SCREENING MAMMOGRAM FOR MALIGNANT NEOPLASM OF BREAST: ICD-10-CM

## 2025-01-30 RX ORDER — ESTRADIOL 0.1 MG/G
CREAM VAGINAL
Qty: 42.5 G | Refills: 3 | Status: SHIPPED | OUTPATIENT
Start: 2025-01-30 | End: 2025-01-30

## 2025-01-30 RX ORDER — ESTRADIOL 0.1 MG/G
CREAM VAGINAL
Qty: 42.5 G | Refills: 3 | Status: SHIPPED | OUTPATIENT
Start: 2025-01-30

## 2025-01-30 ASSESSMENT — FIBROSIS 4 INDEX: FIB4 SCORE: .7428571428571428571

## 2025-01-30 ASSESSMENT — PATIENT HEALTH QUESTIONNAIRE - PHQ9: CLINICAL INTERPRETATION OF PHQ2 SCORE: 0

## 2025-01-30 NOTE — PROGRESS NOTES
Subjective:     CC:  [facility] care    History of Present Illness  The patient is a 54-year-old female who presents to [facility]. Prior PCP was [facility].    She has exhausted her supply of vaginal estrogen cream, which she reports as beneficial. She was initially prescribed a 2 g dose for daily use over a 2-week period, followed by a maintenance dose of 1 g twice weekly. Her last application was approximately 2 weeks ago. She has been unable to obtain a refill from [facility] due to insurance coverage issues.    She has not been taking vitamin D supplements despite a previous prescription for a high dose due to a deficiency.    She has noticed an increase in the visibility of her toenail fungus, which is limited to her right big toe. She has a family history of the same condition in her mother and sister. She also has a family history of melanoma in her mother and great uncle. She is requesting a referral to Dermatology. She reports history of several sunburns.    She experiences an unusual sensation of pressure in her chest when lying down at night, which does not cause pain. She has not attempted to manage this symptom with heartburn medication. She maintains an active lifestyle, including regular exercise, and does not experience any symptoms during these activities.    She has undergone a hysterectomy and cholecystectomy and had a right oophorectomy due to ovarian torsion. She has not been screened for hepatitis C. She has completed her colon cancer screening and is due for a mammogram in 03/2025. She has declined the influenza vaccine at this time. She has a history of vaping but has abstained for a significant period. She does not smoke or use illicit drugs but consumes alcohol socially. She is currently sexually active with her  and has 2 children. She is unemployed.    FAMILY HISTORY  Her grandmother had high blood pressure. Her father had hepatitis C and diabetes. Her mother had melanoma and  is on a statin for cholesterol issues. Her sister and mother had toenail fungus.    ALLERGIES  She is allergic to METRONIDAZOLE GEL.    MEDICATIONS  Current: multivitamin, calcium, vitamin C, vaginal estrogen cream    IMMUNIZATIONS  She has declined the influenza vaccine at this time.          Health Maintenance: Completed    Allergies   Allergen Reactions    Metrogel [Metronidazole]      Patient Active Problem List   Diagnosis    H/O: hysterectomy    Hot flashes    Dyslipidemia    Vitamin D deficiency    Neutropenia (HCC)     Current Outpatient Medications   Medication Sig Dispense Refill    estradiol (ESTRACE) 0.1 MG/GM vaginal cream Insert 1 g intravaginally twice weekly. 42.5 g 3    Multiple Vitamin (MULTI-VITAMIN DAILY PO) Take  by mouth.      Ascorbic Acid (VITAMIN C PO) Take  by mouth.      CALCIUM PO Take  by mouth.       No current facility-administered medications for this visit.     Past Surgical History:   Procedure Laterality Date    ABDOMINAL HYSTERECTOMY TOTAL      CHOLECYSTECTOMY      OOPHORECTOMY Right       Social History     Socioeconomic History    Marital status:      Spouse name: Not on file    Number of children: 2    Years of education: Not on file    Highest education level: Bachelor's degree (e.g., BA, AB, BS)   Occupational History     Employer: NOT EMPLOYED   Tobacco Use    Smoking status: Never    Smokeless tobacco: Never   Vaping Use    Vaping status: Former   Substance and Sexual Activity    Alcohol use: Yes     Comment: Socially    Drug use: Not Currently     Frequency: 2.0 times per week     Types: Marijuana     Comment: Help sleep and stop night sweats    Sexual activity: Yes     Partners: Male     Birth control/protection: Post-Menopausal     Comment:   [place]   Other Topics Concern    Not on file   Social History Narrative    Lives with .     Social Drivers of Health     Financial Resource Strain: Low Risk  (1/29/2025)    Overall Financial  Resource Strain (CARDIA)     Difficulty of Paying Living Expenses: Not very hard   Food Insecurity: No Food Insecurity (1/29/2025)    Hunger Vital Sign     Worried About Running Out of Food in the Last Year: Never true     Ran Out of Food in the Last Year: Never true   Transportation Needs: No Transportation Needs (1/29/2025)    PRAPARE - Transportation     Lack of Transportation (Medical): No     Lack of Transportation (Non-Medical): No   Physical Activity: Insufficiently Active (1/29/2025)    Exercise Vital Sign     Days of Exercise per Week: 3 days     Minutes of Exercise per Session: 40 min   Stress: No Stress Concern Present (1/29/2025)    [employer] - Occupational Stress Questionnaire     Feeling of Stress : Only a little   Social Connections: Unknown (1/29/2025)    Social Connection and Isolation Panel [NHANES]     Frequency of Communication with Friends and Family: Twice a week     Frequency of Social Gatherings with Friends and Family: Patient declined     Attends Episcopal Services: Never     Active Member of Clubs or Organizations: No     Attends Club or Organization Meetings: Never     Marital Status:    Intimate Partner Violence: Not on file   Housing Stability: Low Risk  (1/29/2025)    Housing Stability Vital Sign     Unable to Pay for Housing in the Last Year: No     Number of Times Moved in the Last Year: 0     Homeless in the Last Year: No     Family History   Problem Relation Age of Onset    Hyperlipidemia Mother     Cancer Mother         melanoma    Diabetes Father     Other Father         hepatitis c    Heart Disease Maternal Grandmother     Hypertension Maternal Grandmother     Cancer Other         great uncle had melanoma    Stroke Neg Hx     Ovarian Cancer Neg Hx     Tubal Cancer Neg Hx     Peritoneal Cancer Neg Hx     Colorectal Cancer Neg Hx     Breast Cancer Neg Hx          ROS:     Constitutional:  Negative for chills, fever, fatigue, weight  "loss.  HEENT:  Negative for blurred vision, hearing loss, sore throat.    Respiratory:  Negative for cough, sputum production and shortness of breath.  Cardiovascular:  Negative for chest pain, palpitations and leg swelling.  Gastrointestinal:  Negative for abdominal pain, blood in stool, constipation, diarrhea and vomiting.   Musculoskeletal:  Negative for back pain, falls, joint pain and neck pain.   Skin:  Negative for rash.   Neurological:  Negative for dizziness, seizures, weakness and headaches.   Endo/Heme/Allergies:  Does not bruise/bleed easily.   Psychiatric/Behavioral:  Negative for depression, anxiety and suicidal thoughts.      Objective:     Exam: BP 98/64   Pulse 81   Temp 36.9 °C (98.5 °F) (Temporal)   Ht 1.753 m (5' 9\")   Wt 92.2 kg (203 lb 3.2 oz)   SpO2 97%  Body mass index is 30.01 kg/m².    Gen: Alert and oriented, no acute distress.  Eyes:  PERRL, conjunctivae clear, lids normal.   ENMT: Lips without lesions, good dentition, moist mucous membranes.  Neck: Neck is supple, trachea middle, no thyromegaly.  Lungs: Normal effort, CTAB, no wheezing / rhonchi / rales.  CV: RRR, normal S1 and S2, no murmurs.  GI:  Abdomen soft, non-tender, non-distended with normal bowel sounds.  MSK:  Normal ROM.  Ext: No clubbing, cyanosis, or edema.  Skin:  Warm and dry with no rashes or lesions.  Neuro: AAO x 3, no acute focal deficits.  Psych: Normal affect and mood.      Assessment & Plan:   54 y.o. female with the following -    1. Encounter to establish care  Patient presents today to establish care.  Chart was reviewed and history was discussed in detail with the patient.  Previous labs reviewed.  She underwent hysterectomy and Pap smear not indicated.  Mammogram is next due in March.  UTD with colon cancer screening.  She declined influenza vaccine.    2. Dyslipidemia  Chronic, controlled.  March 2023  and .  She reports mother is on a statin.  Repeat lipid panel ordered and if still " elevated we discussed ordering a cardiac CT to help guide management.  - Lipid Profile; Future    3. Vitamin D deficiency  Chronic, controlled.  March 2023 vitamin D 28.  Advised to start daily vitamin D supplement and repeat labs ordered.  - VITAMIN D,25 HYDROXY (DEFICIENCY); Future    4. Neutropenia, unspecified type (HCC)  Chronic, stable.  WBC 4.13 years ago but was normal in March 2023 with slightly low neutrophils and slightly high lymphocytes.  Repeat labs ordered.  - CBC WITHOUT DIFFERENTIAL; Future  - Comp Metabolic Panel; Future    5. Atrophic vaginitis  Chronic, controlled.  Continue vaginal estrogen 1 g twice weekly.  - estradiol (ESTRACE) 0.1 MG/GM vaginal cream; Insert 1 g intravaginally twice weekly.  Dispense: 42.5 g; Refill: 3    6. Chest heaviness  This is a new problem.  Symptoms are usually present when she lays down at night.  She denies any symptoms with activity or exertion.  Possibly GERD and advised 2-week trial of PPI at night.    7. Onychomycosis  - Referral to Dermatology    8. Family history of melanoma  9. Skin cancer screening  - Referral to Dermatology    10. Encounter for screening mammogram for malignant neoplasm of breast  - MA-SCREENING MAMMO BILAT W/TOMOSYNTHESIS W/CAD; Future    11. Need for hepatitis C screening test  - HEP C VIRUS ANTIBODY; Future        Return in about 1 year (around 1/30/2026) for Annual preventive visit.    Verbal consent was acquired by the patient to use [employer] ambient listening note generation during this visit: Yes.    Please note that this dictation was created using voice recognition software. I have made every reasonable attempt to correct obvious errors, but I expect that there are errors of grammar and possibly content that I did not discover before finalizing the note.

## 2025-02-04 ENCOUNTER — HOSPITAL ENCOUNTER (OUTPATIENT)
Dept: LAB | Facility: MEDICAL CENTER | Age: 55
End: 2025-02-04
Attending: STUDENT IN AN ORGANIZED HEALTH CARE EDUCATION/TRAINING PROGRAM
Payer: OTHER GOVERNMENT

## 2025-02-04 DIAGNOSIS — E55.9 VITAMIN D DEFICIENCY: ICD-10-CM

## 2025-02-04 DIAGNOSIS — E78.5 DYSLIPIDEMIA: ICD-10-CM

## 2025-02-04 DIAGNOSIS — Z11.59 NEED FOR HEPATITIS C SCREENING TEST: ICD-10-CM

## 2025-02-04 DIAGNOSIS — D70.9 NEUTROPENIA, UNSPECIFIED TYPE (HCC): ICD-10-CM

## 2025-02-04 LAB
ERYTHROCYTE [DISTWIDTH] IN BLOOD BY AUTOMATED COUNT: 41.8 FL (ref 35.9–50)
HCT VFR BLD AUTO: 46.1 % (ref 37–47)
HCV AB SER QL: NORMAL
HGB BLD-MCNC: 15.4 G/DL (ref 12–16)
MCH RBC QN AUTO: 31.6 PG (ref 27–33)
MCHC RBC AUTO-ENTMCNC: 33.4 G/DL (ref 32.2–35.5)
MCV RBC AUTO: 94.7 FL (ref 81.4–97.8)
PLATELET # BLD AUTO: 271 K/UL (ref 164–446)
PMV BLD AUTO: 10.5 FL (ref 9–12.9)
RBC # BLD AUTO: 4.87 M/UL (ref 4.2–5.4)
WBC # BLD AUTO: 8 K/UL (ref 4.8–10.8)

## 2025-02-04 PROCEDURE — 86803 HEPATITIS C AB TEST: CPT

## 2025-02-04 PROCEDURE — 80053 COMPREHEN METABOLIC PANEL: CPT

## 2025-02-04 PROCEDURE — 80061 LIPID PANEL: CPT

## 2025-02-04 PROCEDURE — 85027 COMPLETE CBC AUTOMATED: CPT

## 2025-02-04 PROCEDURE — 82306 VITAMIN D 25 HYDROXY: CPT

## 2025-02-04 PROCEDURE — 36415 COLL VENOUS BLD VENIPUNCTURE: CPT

## 2025-02-05 LAB
25(OH)D3 SERPL-MCNC: 37 NG/ML (ref 30–100)
ALBUMIN SERPL BCP-MCNC: 4.6 G/DL (ref 3.2–4.9)
ALBUMIN/GLOB SERPL: 1.8 G/DL
ALP SERPL-CCNC: 62 U/L (ref 30–99)
ALT SERPL-CCNC: 27 U/L (ref 2–50)
ANION GAP SERPL CALC-SCNC: 13 MMOL/L (ref 7–16)
AST SERPL-CCNC: 26 U/L (ref 12–45)
BILIRUB SERPL-MCNC: 0.5 MG/DL (ref 0.1–1.5)
BUN SERPL-MCNC: 19 MG/DL (ref 8–22)
CALCIUM ALBUM COR SERPL-MCNC: 9.2 MG/DL (ref 8.5–10.5)
CALCIUM SERPL-MCNC: 9.7 MG/DL (ref 8.5–10.5)
CHLORIDE SERPL-SCNC: 104 MMOL/L (ref 96–112)
CHOLEST SERPL-MCNC: 246 MG/DL (ref 100–199)
CO2 SERPL-SCNC: 24 MMOL/L (ref 20–33)
CREAT SERPL-MCNC: 0.86 MG/DL (ref 0.5–1.4)
GFR SERPLBLD CREATININE-BSD FMLA CKD-EPI: 81 ML/MIN/1.73 M 2
GLOBULIN SER CALC-MCNC: 2.6 G/DL (ref 1.9–3.5)
GLUCOSE SERPL-MCNC: 100 MG/DL (ref 65–99)
HDLC SERPL-MCNC: 56 MG/DL
LDLC SERPL CALC-MCNC: 151 MG/DL
POTASSIUM SERPL-SCNC: 4.2 MMOL/L (ref 3.6–5.5)
PROT SERPL-MCNC: 7.2 G/DL (ref 6–8.2)
SODIUM SERPL-SCNC: 141 MMOL/L (ref 135–145)
TRIGL SERPL-MCNC: 193 MG/DL (ref 0–149)

## 2025-02-24 ENCOUNTER — OFFICE VISIT (OUTPATIENT)
Dept: DERMATOLOGY | Facility: IMAGING CENTER | Age: 55
End: 2025-02-24
Payer: OTHER GOVERNMENT

## 2025-02-24 DIAGNOSIS — L82.1 SEBORRHEIC KERATOSIS: ICD-10-CM

## 2025-02-24 DIAGNOSIS — L57.8 OTHER SKIN CHANGES DUE TO CHRONIC EXPOSURE TO NONIONIZING RADIATION: ICD-10-CM

## 2025-02-24 DIAGNOSIS — D23.9 DERMATOFIBROMA: ICD-10-CM

## 2025-02-24 DIAGNOSIS — Z12.83 SKIN CANCER SCREENING: ICD-10-CM

## 2025-02-24 DIAGNOSIS — D22.9 MULTIPLE NEVI: ICD-10-CM

## 2025-02-24 DIAGNOSIS — D18.01 CHERRY ANGIOMA: ICD-10-CM

## 2025-02-24 DIAGNOSIS — B35.1 ONYCHOMYCOSIS: ICD-10-CM

## 2025-02-24 PROCEDURE — 99204 OFFICE O/P NEW MOD 45 MIN: CPT | Performed by: STUDENT IN AN ORGANIZED HEALTH CARE EDUCATION/TRAINING PROGRAM

## 2025-02-24 RX ORDER — FLUCONAZOLE 200 MG/1
200 TABLET ORAL
Qty: 12 TABLET | Refills: 0 | Status: SHIPPED | OUTPATIENT
Start: 2025-02-24

## 2025-02-24 RX ORDER — ECONAZOLE NITRATE 10 MG/G
CREAM TOPICAL
Qty: 30 G | Refills: 3 | Status: SHIPPED | OUTPATIENT
Start: 2025-02-24

## 2025-02-24 NOTE — PROGRESS NOTES
[facility] DERMATOLOGY CLINIC NOTE    Chief Complaint   Patient presents with    Establish Care        HPI:    Elva Asher is a 55 y.o. female here for evaluation of above, YUDY.     Today notes following concern:   - TBE pt has a spot of concern on her rt leg, also has fungus on toe nails      No other symptomatic (itching, painful, burning) or changing lesions.       Dermatology History:      - Prior history of skin cancer: no     - Family history of skin cancer: mother melanoma       Review of Systems: No fevers, chill. Pertinent positives and negatives above.       Medications, Medical History, Surgical History, Family History & Allergies:  Reviewed in the chart, relevant history noted above.       PHYSICAL EXAM  Full body skin check of the scalp, face, neck, trunk, extremities was performed. Patient declined exam of groin  Skin type 2    - Dystrophic yellow nailplates on the R>L great toes  - hyperpigmented firm papule that dimples on pressure on the LLE  - scattered melanotic macules and papules on the trunk and extremities  - scattered tan macules without surface scale on sun exposed areas of face, neck, upper chest, arms  - scattered tan waxy to gray stuck on papules and plaques on the trunk and extremities  - scattered 2-3mm red papules on trunk, extremities    ASSESSMENT & PLAN    # Onychomycosis, chronic, not at treatment goal  Discussed this is a nail fungal infection. Treatment is usually with topical antifungal and in some cases, oral antifungal.  - Start fluconazole 200mg once weekly. Oral antifungal - discussed potential for liver injury, instructed patient to avoid alcohol consumption.   - Start econazole cream daily to affected nails and surrounding skin    # Melanotic nevi  - clinically benign appearing today  - ABCDEs of atypical appearing moles discussed.     # Lentigines  - discussed this is a result of sun exposure, photodamage  - regular sun protective practices with hats/clothing, SPF 30+  with regular application and reapplication recommended    # Seborrheic keratosis  # Cherry angioma  # Dermatofibroma  - reassure benign, no treatment needed      Skin Cancer Prevention Counseling   Advise regular sun protection/sunscreen use, SPF 30 or greater, recommend mineral sunscreen, and to reapply every  minutes.   Recommend broad brimmed hats, UPF sun protective clothing when outdoors for extended periods of time   Recommend self checks at home,  ABCDEs of melanoma discussed       Return in about 1 year (around 2/24/2026) for Skin check.      Lillian Ayala MD  [facility]

## 2025-03-10 ENCOUNTER — RESULTS FOLLOW-UP (OUTPATIENT)
Dept: MEDICAL GROUP | Facility: PHYSICIAN GROUP | Age: 55
End: 2025-03-10
Payer: OTHER GOVERNMENT

## 2025-03-11 DIAGNOSIS — E78.5 DYSLIPIDEMIA: ICD-10-CM

## 2025-03-17 ENCOUNTER — HOSPITAL ENCOUNTER (OUTPATIENT)
Dept: RADIOLOGY | Facility: MEDICAL CENTER | Age: 55
End: 2025-03-17
Attending: STUDENT IN AN ORGANIZED HEALTH CARE EDUCATION/TRAINING PROGRAM
Payer: COMMERCIAL

## 2025-03-17 DIAGNOSIS — E78.5 DYSLIPIDEMIA: ICD-10-CM

## 2025-03-17 PROCEDURE — 4410556 CT-CARDIAC SCORING (SELF PAY ONLY)

## 2025-03-18 ENCOUNTER — OFFICE VISIT (OUTPATIENT)
Dept: MEDICAL GROUP | Facility: PHYSICIAN GROUP | Age: 55
End: 2025-03-18
Payer: OTHER GOVERNMENT

## 2025-03-18 VITALS
DIASTOLIC BLOOD PRESSURE: 78 MMHG | HEART RATE: 99 BPM | OXYGEN SATURATION: 99 % | TEMPERATURE: 97.9 F | HEIGHT: 69 IN | WEIGHT: 196.1 LBS | BODY MASS INDEX: 29.04 KG/M2 | SYSTOLIC BLOOD PRESSURE: 102 MMHG

## 2025-03-18 DIAGNOSIS — E78.5 DYSLIPIDEMIA: ICD-10-CM

## 2025-03-18 DIAGNOSIS — H61.91 LESION OF RIGHT EARLOBE: ICD-10-CM

## 2025-03-18 DIAGNOSIS — E55.9 VITAMIN D DEFICIENCY: ICD-10-CM

## 2025-03-18 DIAGNOSIS — N95.2 ATROPHIC VAGINITIS: ICD-10-CM

## 2025-03-18 PROBLEM — D70.9 NEUTROPENIA (HCC): Status: RESOLVED | Noted: 2025-01-30 | Resolved: 2025-03-18

## 2025-03-18 PROBLEM — Z80.8 FAMILY HISTORY OF MELANOMA: Status: ACTIVE | Noted: 2025-03-18

## 2025-03-18 PROCEDURE — 17250 CHEM CAUT OF GRANLTJ TISSUE: CPT | Performed by: STUDENT IN AN ORGANIZED HEALTH CARE EDUCATION/TRAINING PROGRAM

## 2025-03-18 PROCEDURE — 3074F SYST BP LT 130 MM HG: CPT | Performed by: STUDENT IN AN ORGANIZED HEALTH CARE EDUCATION/TRAINING PROGRAM

## 2025-03-18 PROCEDURE — 99214 OFFICE O/P EST MOD 30 MIN: CPT | Mod: 25 | Performed by: STUDENT IN AN ORGANIZED HEALTH CARE EDUCATION/TRAINING PROGRAM

## 2025-03-18 PROCEDURE — 3078F DIAST BP <80 MM HG: CPT | Performed by: STUDENT IN AN ORGANIZED HEALTH CARE EDUCATION/TRAINING PROGRAM

## 2025-03-18 RX ORDER — ESTRADIOL 0.1 MG/G
CREAM VAGINAL
Qty: 42.5 G | Refills: 3 | Status: SHIPPED | OUTPATIENT
Start: 2025-03-18

## 2025-03-18 ASSESSMENT — FIBROSIS 4 INDEX: FIB4 SCORE: 0.98

## 2025-03-18 NOTE — PROGRESS NOTES
"Subjective:     Chief Complaint   Patient presents with    Bump     Bump behind r ear , feeling of burning.          History of Present Illness  The patient presents for evaluation of a bump on her right ear.    She first observed the presence of the bump approximately 2 weeks ago, coinciding with a sensation of stinging in the same area. She has no prior history of skin cancer. She reports no recent sun exposure that could have resulted in a sunburn. She has previously undergone cryotherapy for a small lesion on her hand and tolerate the procedure. She last saw dermatology on 2/24 for YUDY. Family history of mother with melanoma.    She has been monitoring her diet and believes she has lost weight. She has not been eating a lot of red meat. She has been eating fish. She takes a multivitamin and vitamin D supplement. BMI 28.96 and she has lost 7 pounds since her last visit. She reports heartburn has also improved with the weight loss and dietary changes.    She is on vaginal estrogen but reports her last refill still had her previous PCP's name on it.    FAMILY HISTORY  Her mother had a melanoma on her stomach.    MEDICATIONS  Current: multivitamin, vitamin D, vaginal estrogen, Diflucan        Health Maintenance: Completed    ROS:  Negative except as stated above.      Objective:     Exam:  /78 (BP Location: Left arm, Patient Position: Sitting, BP Cuff Size: Adult)   Pulse 99   Temp 36.6 °C (97.9 °F) (Temporal)   Ht 1.753 m (5' 9\")   Wt 89 kg (196 lb 1.6 oz)   SpO2 99%   BMI 28.96 kg/m²  Body mass index is 28.96 kg/m².    Physical Exam    Gen: Alert and oriented, no acute distress.  Lungs: Normal effort, CTAB, no wheezing / rhonchi / rales.  CV: RRR, normal S1 and S2, no murmurs.  Skin:    See picture below of lesion on right ear.        CRYOTHERAPY:  Discussed the benign nature of these lesions.     Verbal consent was obtained. Pre-procedure pain reported as 0/10. Cryotherapy performed using liquid " nitrogen, freeze-thaw-freeze technique x 3.  Patient tolerated the procedure well.  Post-procedure was 0/10.  Aftercare as well as potential for blistering was discussed.  I explained that the procedure may need to be repeated if there is not complete resolution and she should follow-up with dermatology if it worsens.     Labs:  No visits with results within 1 Month(s) from this visit.   Latest known visit with results is:   Hospital Outpatient Visit on 02/04/2025   Component Date Value Ref Range Status    25-Hydroxy   Vitamin D 25 02/04/2025 37  30 - 100 ng/mL Final    Comment: Adult Ranges:   <20 ng/mL - Deficiency  20-29 ng/mL - Insufficiency   ng/mL - Sufficiency  Electrochemiluminescence binding assay performed using Roche pascale e  immunoassay analyzer.  The [employer] Vitamin D total II assay is intended for  the quantitative determination of total 25 hydroxyvitamin D in human serum  and plasma. This assay is to be used as an aid in the assessment of vitamin  D sufficiency in adults.      Hepatitis C Antibody 02/04/2025 Non-Reactive  Non-Reactive Final    Comment: Antibodies to HCV were not detected.  The Roche anti-HCV is a diagnostic test for the qualitative determination of  antibodies to the hepatitis C virus in human serum and plasma. The results  should be used and interpreted only in the context of the overall clinical  picture. A negative test result does not exclude the possibility of exposure  to hepatitis C virus.  Note: Assay performance characteristics have not been established in  populations of immunocompromised or immunosuppressed patients.      Cholesterol,Tot 02/04/2025 246 (H)  100 - 199 mg/dL Final    Triglycerides 02/04/2025 193 (H)  0 - 149 mg/dL Final    HDL 02/04/2025 56  >=40 mg/dL Final    LDL 02/04/2025 151 (H)  <100 mg/dL Final    Sodium 02/04/2025 141  135 - 145 mmol/L Final    Potassium 02/04/2025 4.2  3.6 - 5.5 mmol/L Final    Chloride 02/04/2025 104  96 - 112 mmol/L Final     Co2 02/04/2025 24  20 - 33 mmol/L Final    Anion Gap 02/04/2025 13.0  7.0 - 16.0 Final    Glucose 02/04/2025 100 (H)  65 - 99 mg/dL Final    Bun 02/04/2025 19  8 - 22 mg/dL Final    Creatinine 02/04/2025 0.86  0.50 - 1.40 mg/dL Final    Calcium 02/04/2025 9.7  8.5 - 10.5 mg/dL Final    Correct Calcium 02/04/2025 9.2  8.5 - 10.5 mg/dL Final    AST(SGOT) 02/04/2025 26  12 - 45 U/L Final    ALT(SGPT) 02/04/2025 27  2 - 50 U/L Final    Alkaline Phosphatase 02/04/2025 62  30 - 99 U/L Final    Total Bilirubin 02/04/2025 0.5  0.1 - 1.5 mg/dL Final    Albumin 02/04/2025 4.6  3.2 - 4.9 g/dL Final    Total Protein 02/04/2025 7.2  6.0 - 8.2 g/dL Final    Globulin 02/04/2025 2.6  1.9 - 3.5 g/dL Final    A-G Ratio 02/04/2025 1.8  g/dL Final    WBC 02/04/2025 8.0  4.8 - 10.8 K/uL Final    RBC 02/04/2025 4.87  4.20 - 5.40 M/uL Final    Hemoglobin 02/04/2025 15.4  12.0 - 16.0 g/dL Final    Hematocrit 02/04/2025 46.1  37.0 - 47.0 % Final    MCV 02/04/2025 94.7  81.4 - 97.8 fL Final    MCH 02/04/2025 31.6  27.0 - 33.0 pg Final    MCHC 02/04/2025 33.4  32.2 - 35.5 g/dL Final    RDW 02/04/2025 41.8  35.9 - 50.0 fL Final    Platelet Count 02/04/2025 271  164 - 446 K/uL Final    MPV 02/04/2025 10.5  9.0 - 12.9 fL Final    GFR (CKD-EPI) 02/04/2025 81  >60 mL/min/1.73 m 2 Final    Comment: Estimated Glomerular Filtration Rate is calculated using  race neutral CKD-EPI 2021 equation per NKF-ASN recommendations.         Assessment & Plan:     55 y.o. female with the following -     1. Dyslipidemia  Chronic, uncontrolled.  , ,  with normal HDL.  March 2025 coronary calcium score 0 and statin not indicated.  Advised to start daily fish oil and continue to limit saturated / trans fat in diet.    2. Vitamin D deficiency  Chronic, controlled.  Vit D WNL.  Continue daily multivitamin and vitamin D supplement.    3. Atrophic vaginitis  - estradiol (ESTRACE) 0.1 MG/GM vaginal cream; Insert 1 g intravaginally twice weekly.   Dispense: 42.5 g; Refill: 3    5. Lesion of right earlobe  This is a new problem.  She first noticed it 2 weeks ago.  Her last YUDY with dermatology was on 2/24.  She is concerned due to family history of melanoma.  Lesion appears benign and cryotherapy was performed today.  Advised to follow-up with dermatology if it does not resolve.        Return in about 10 months (around 1/29/2026), or if symptoms worsen or fail to improve.    Verbal consent was acquired by the patient to use [employer] ambient listening note generation during this visit: Yes.    Please note that this dictation was created using voice recognition software. I have made every reasonable attempt to correct obvious errors, but I expect that there are errors of grammar and possibly content that I did not discover before finalizing the note.

## 2025-03-21 ENCOUNTER — APPOINTMENT (OUTPATIENT)
Dept: RADIOLOGY | Facility: MEDICAL CENTER | Age: 55
DRG: 439 | End: 2025-03-21
Attending: EMERGENCY MEDICINE
Payer: OTHER GOVERNMENT

## 2025-03-21 ENCOUNTER — HOSPITAL ENCOUNTER (INPATIENT)
Facility: MEDICAL CENTER | Age: 55
LOS: 1 days | DRG: 439 | End: 2025-03-22
Attending: EMERGENCY MEDICINE | Admitting: HOSPITALIST
Payer: OTHER GOVERNMENT

## 2025-03-21 DIAGNOSIS — R10.13 EPIGASTRIC PAIN: ICD-10-CM

## 2025-03-21 DIAGNOSIS — K75.9 HEPATITIS: ICD-10-CM

## 2025-03-21 DIAGNOSIS — K85.90 ACUTE PANCREATITIS, UNSPECIFIED COMPLICATION STATUS, UNSPECIFIED PANCREATITIS TYPE: ICD-10-CM

## 2025-03-21 PROBLEM — K85.10 ACUTE GALLSTONE PANCREATITIS: Status: ACTIVE | Noted: 2025-03-21

## 2025-03-21 PROBLEM — D72.829 LEUKOCYTOSIS: Status: ACTIVE | Noted: 2025-03-21

## 2025-03-21 PROBLEM — R79.89 ELEVATED LFTS: Status: ACTIVE | Noted: 2025-03-21

## 2025-03-21 LAB
ALBUMIN SERPL BCP-MCNC: 4.4 G/DL (ref 3.2–4.9)
ALBUMIN/GLOB SERPL: 1.6 G/DL
ALP SERPL-CCNC: 113 U/L (ref 30–99)
ALT SERPL-CCNC: 677 U/L (ref 2–50)
ANION GAP SERPL CALC-SCNC: 15 MMOL/L (ref 7–16)
AST SERPL-CCNC: 926 U/L (ref 12–45)
BASOPHILS # BLD AUTO: 0.5 % (ref 0–1.8)
BASOPHILS # BLD: 0.07 K/UL (ref 0–0.12)
BILIRUB SERPL-MCNC: 1.4 MG/DL (ref 0.1–1.5)
BUN SERPL-MCNC: 12 MG/DL (ref 8–22)
CALCIUM ALBUM COR SERPL-MCNC: 9.3 MG/DL (ref 8.5–10.5)
CALCIUM SERPL-MCNC: 9.6 MG/DL (ref 8.4–10.2)
CHLORIDE SERPL-SCNC: 103 MMOL/L (ref 96–112)
CO2 SERPL-SCNC: 23 MMOL/L (ref 20–33)
CREAT SERPL-MCNC: 0.86 MG/DL (ref 0.5–1.4)
EKG IMPRESSION: NORMAL
EOSINOPHIL # BLD AUTO: 0.03 K/UL (ref 0–0.51)
EOSINOPHIL NFR BLD: 0.2 % (ref 0–6.9)
ERYTHROCYTE [DISTWIDTH] IN BLOOD BY AUTOMATED COUNT: 41.6 FL (ref 35.9–50)
GFR SERPLBLD CREATININE-BSD FMLA CKD-EPI: 81 ML/MIN/1.73 M 2
GLOBULIN SER CALC-MCNC: 2.7 G/DL (ref 1.9–3.5)
GLUCOSE SERPL-MCNC: 118 MG/DL (ref 65–99)
HCT VFR BLD AUTO: 45.5 % (ref 37–47)
HGB BLD-MCNC: 14.8 G/DL (ref 12–16)
IMM GRANULOCYTES # BLD AUTO: 0.06 K/UL (ref 0–0.11)
IMM GRANULOCYTES NFR BLD AUTO: 0.4 % (ref 0–0.9)
LIPASE SERPL-CCNC: 2481 U/L (ref 11–82)
LYMPHOCYTES # BLD AUTO: 0.7 K/UL (ref 1–4.8)
LYMPHOCYTES NFR BLD: 5.1 % (ref 22–41)
MCH RBC QN AUTO: 31 PG (ref 27–33)
MCHC RBC AUTO-ENTMCNC: 32.5 G/DL (ref 32.2–35.5)
MCV RBC AUTO: 95.2 FL (ref 81.4–97.8)
MONOCYTES # BLD AUTO: 1.03 K/UL (ref 0–0.85)
MONOCYTES NFR BLD AUTO: 7.4 % (ref 0–13.4)
NEUTROPHILS # BLD AUTO: 11.96 K/UL (ref 1.82–7.42)
NEUTROPHILS NFR BLD: 86.4 % (ref 44–72)
NRBC # BLD AUTO: 0 K/UL
NRBC BLD-RTO: 0 /100 WBC (ref 0–0.2)
PLATELET # BLD AUTO: 236 K/UL (ref 164–446)
PMV BLD AUTO: 11.1 FL (ref 9–12.9)
POTASSIUM SERPL-SCNC: 3.9 MMOL/L (ref 3.6–5.5)
PROT SERPL-MCNC: 7.1 G/DL (ref 6–8.2)
RBC # BLD AUTO: 4.78 M/UL (ref 4.2–5.4)
SODIUM SERPL-SCNC: 141 MMOL/L (ref 135–145)
TROPONIN T SERPL-MCNC: <6 NG/L (ref 6–19)
TROPONIN T SERPL-MCNC: <6 NG/L (ref 6–19)
WBC # BLD AUTO: 13.9 K/UL (ref 4.8–10.8)

## 2025-03-21 PROCEDURE — 99285 EMERGENCY DEPT VISIT HI MDM: CPT

## 2025-03-21 PROCEDURE — 85025 COMPLETE CBC W/AUTO DIFF WBC: CPT

## 2025-03-21 PROCEDURE — 83690 ASSAY OF LIPASE: CPT

## 2025-03-21 PROCEDURE — 71045 X-RAY EXAM CHEST 1 VIEW: CPT

## 2025-03-21 PROCEDURE — 700105 HCHG RX REV CODE 258: Performed by: EMERGENCY MEDICINE

## 2025-03-21 PROCEDURE — 700111 HCHG RX REV CODE 636 W/ 250 OVERRIDE (IP): Mod: JZ | Performed by: EMERGENCY MEDICINE

## 2025-03-21 PROCEDURE — 700111 HCHG RX REV CODE 636 W/ 250 OVERRIDE (IP): Performed by: HOSPITALIST

## 2025-03-21 PROCEDURE — 81001 URINALYSIS AUTO W/SCOPE: CPT

## 2025-03-21 PROCEDURE — 80053 COMPREHEN METABOLIC PANEL: CPT

## 2025-03-21 PROCEDURE — 93005 ELECTROCARDIOGRAM TRACING: CPT | Mod: TC | Performed by: EMERGENCY MEDICINE

## 2025-03-21 PROCEDURE — 84484 ASSAY OF TROPONIN QUANT: CPT

## 2025-03-21 PROCEDURE — 36415 COLL VENOUS BLD VENIPUNCTURE: CPT

## 2025-03-21 PROCEDURE — 96376 TX/PRO/DX INJ SAME DRUG ADON: CPT

## 2025-03-21 PROCEDURE — 770001 HCHG ROOM/CARE - MED/SURG/GYN PRIV*

## 2025-03-21 PROCEDURE — 96375 TX/PRO/DX INJ NEW DRUG ADDON: CPT

## 2025-03-21 PROCEDURE — 80074 ACUTE HEPATITIS PANEL: CPT

## 2025-03-21 PROCEDURE — 96374 THER/PROPH/DIAG INJ IV PUSH: CPT

## 2025-03-21 RX ORDER — ONDANSETRON 2 MG/ML
4 INJECTION INTRAMUSCULAR; INTRAVENOUS ONCE
Status: COMPLETED | OUTPATIENT
Start: 2025-03-21 | End: 2025-03-21

## 2025-03-21 RX ORDER — AMOXICILLIN 250 MG
2 CAPSULE ORAL EVERY EVENING
Status: DISCONTINUED | OUTPATIENT
Start: 2025-03-22 | End: 2025-03-22 | Stop reason: HOSPADM

## 2025-03-21 RX ORDER — PROMETHAZINE HYDROCHLORIDE 25 MG/1
12.5-25 SUPPOSITORY RECTAL EVERY 4 HOURS PRN
Status: DISCONTINUED | OUTPATIENT
Start: 2025-03-21 | End: 2025-03-22 | Stop reason: HOSPADM

## 2025-03-21 RX ORDER — ONDANSETRON 4 MG/1
4 TABLET, ORALLY DISINTEGRATING ORAL EVERY 4 HOURS PRN
Status: DISCONTINUED | OUTPATIENT
Start: 2025-03-21 | End: 2025-03-22 | Stop reason: HOSPADM

## 2025-03-21 RX ORDER — ONDANSETRON 2 MG/ML
4 INJECTION INTRAMUSCULAR; INTRAVENOUS EVERY 4 HOURS PRN
Status: DISCONTINUED | OUTPATIENT
Start: 2025-03-21 | End: 2025-03-22 | Stop reason: HOSPADM

## 2025-03-21 RX ORDER — PROMETHAZINE HYDROCHLORIDE 25 MG/1
12.5-25 TABLET ORAL EVERY 4 HOURS PRN
Status: DISCONTINUED | OUTPATIENT
Start: 2025-03-21 | End: 2025-03-22 | Stop reason: HOSPADM

## 2025-03-21 RX ORDER — POLYETHYLENE GLYCOL 3350 17 G/17G
1 POWDER, FOR SOLUTION ORAL
Status: DISCONTINUED | OUTPATIENT
Start: 2025-03-21 | End: 2025-03-22 | Stop reason: HOSPADM

## 2025-03-21 RX ORDER — SODIUM CHLORIDE, SODIUM LACTATE, POTASSIUM CHLORIDE, AND CALCIUM CHLORIDE .6; .31; .03; .02 G/100ML; G/100ML; G/100ML; G/100ML
1000 INJECTION, SOLUTION INTRAVENOUS ONCE
Status: COMPLETED | OUTPATIENT
Start: 2025-03-21 | End: 2025-03-21

## 2025-03-21 RX ORDER — PROCHLORPERAZINE EDISYLATE 5 MG/ML
5-10 INJECTION INTRAMUSCULAR; INTRAVENOUS EVERY 4 HOURS PRN
Status: DISCONTINUED | OUTPATIENT
Start: 2025-03-21 | End: 2025-03-22 | Stop reason: HOSPADM

## 2025-03-21 RX ORDER — HYDROMORPHONE HYDROCHLORIDE 1 MG/ML
1 INJECTION, SOLUTION INTRAMUSCULAR; INTRAVENOUS; SUBCUTANEOUS
Status: DISCONTINUED | OUTPATIENT
Start: 2025-03-21 | End: 2025-03-22 | Stop reason: HOSPADM

## 2025-03-21 RX ORDER — HYDROMORPHONE HYDROCHLORIDE 1 MG/ML
0.5 INJECTION, SOLUTION INTRAMUSCULAR; INTRAVENOUS; SUBCUTANEOUS
Status: DISCONTINUED | OUTPATIENT
Start: 2025-03-21 | End: 2025-03-22 | Stop reason: HOSPADM

## 2025-03-21 RX ORDER — SODIUM CHLORIDE 9 MG/ML
INJECTION, SOLUTION INTRAVENOUS CONTINUOUS
Status: DISCONTINUED | OUTPATIENT
Start: 2025-03-22 | End: 2025-03-22 | Stop reason: HOSPADM

## 2025-03-21 RX ADMIN — HYDROMORPHONE HYDROCHLORIDE 0.5 MG: 1 INJECTION, SOLUTION INTRAMUSCULAR; INTRAVENOUS; SUBCUTANEOUS at 23:51

## 2025-03-21 RX ADMIN — ONDANSETRON 4 MG: 2 INJECTION INTRAMUSCULAR; INTRAVENOUS at 21:23

## 2025-03-21 RX ADMIN — FAMOTIDINE 20 MG: 10 INJECTION, SOLUTION INTRAVENOUS at 21:23

## 2025-03-21 RX ADMIN — SODIUM CHLORIDE, POTASSIUM CHLORIDE, SODIUM LACTATE AND CALCIUM CHLORIDE 1000 ML: 600; 310; 30; 20 INJECTION, SOLUTION INTRAVENOUS at 21:18

## 2025-03-21 RX ADMIN — ONDANSETRON 4 MG: 2 INJECTION INTRAMUSCULAR; INTRAVENOUS at 23:51

## 2025-03-21 ASSESSMENT — FIBROSIS 4 INDEX: FIB4 SCORE: 0.98

## 2025-03-21 ASSESSMENT — ENCOUNTER SYMPTOMS
DOUBLE VISION: 0
NAUSEA: 0
WEAKNESS: 0
FEVER: 0
BRUISES/BLEEDS EASILY: 0
BLURRED VISION: 0
COUGH: 0
DIZZINESS: 0
SHORTNESS OF BREATH: 0
MYALGIAS: 0
DEPRESSION: 0
INSOMNIA: 0
SORE THROAT: 0
PALPITATIONS: 0
HEADACHES: 0
VOMITING: 0
NECK PAIN: 0

## 2025-03-21 ASSESSMENT — HEART SCORE
HEART SCORE: 2
TROPONIN: LESS THAN OR EQUAL TO NORMAL LIMIT
ECG: NORMAL
AGE: 45-64
HISTORY: SLIGHTLY SUSPICIOUS
RISK FACTORS: 1-2 RISK FACTORS

## 2025-03-22 ENCOUNTER — HOSPITAL ENCOUNTER (INPATIENT)
Facility: MEDICAL CENTER | Age: 55
LOS: 3 days | DRG: 444 | End: 2025-03-25
Attending: STUDENT IN AN ORGANIZED HEALTH CARE EDUCATION/TRAINING PROGRAM | Admitting: INTERNAL MEDICINE
Payer: OTHER GOVERNMENT

## 2025-03-22 ENCOUNTER — APPOINTMENT (OUTPATIENT)
Dept: RADIOLOGY | Facility: MEDICAL CENTER | Age: 55
DRG: 439 | End: 2025-03-22
Attending: HOSPITALIST
Payer: OTHER GOVERNMENT

## 2025-03-22 VITALS
TEMPERATURE: 100.6 F | HEIGHT: 69 IN | OXYGEN SATURATION: 91 % | HEART RATE: 74 BPM | WEIGHT: 193.34 LBS | DIASTOLIC BLOOD PRESSURE: 47 MMHG | RESPIRATION RATE: 18 BRPM | BODY MASS INDEX: 28.64 KG/M2 | SYSTOLIC BLOOD PRESSURE: 102 MMHG

## 2025-03-22 DIAGNOSIS — K83.09 ASCENDING CHOLANGITIS: ICD-10-CM

## 2025-03-22 DIAGNOSIS — E27.40 ADRENAL INSUFFICIENCY (HCC): ICD-10-CM

## 2025-03-22 DIAGNOSIS — R00.1 BRADYCARDIA: ICD-10-CM

## 2025-03-22 LAB
ALBUMIN SERPL BCP-MCNC: 3.8 G/DL (ref 3.2–4.9)
ALBUMIN/GLOB SERPL: 1.9 G/DL
ALP SERPL-CCNC: 94 U/L (ref 30–99)
ALT SERPL-CCNC: 535 U/L (ref 2–50)
ANION GAP SERPL CALC-SCNC: 12 MMOL/L (ref 7–16)
APPEARANCE UR: ABNORMAL
AST SERPL-CCNC: 516 U/L (ref 12–45)
BACTERIA #/AREA URNS HPF: ABNORMAL /HPF
BILIRUB SERPL-MCNC: 0.8 MG/DL (ref 0.1–1.5)
BILIRUB UR QL STRIP.AUTO: NEGATIVE
BUN SERPL-MCNC: 11 MG/DL (ref 8–22)
CALCIUM ALBUM COR SERPL-MCNC: 9 MG/DL (ref 8.5–10.5)
CALCIUM SERPL-MCNC: 8.8 MG/DL (ref 8.4–10.2)
CASTS URNS QL MICRO: ABNORMAL /LPF (ref 0–2)
CHLORIDE SERPL-SCNC: 106 MMOL/L (ref 96–112)
CO2 SERPL-SCNC: 23 MMOL/L (ref 20–33)
COLOR UR: YELLOW
CREAT SERPL-MCNC: 0.74 MG/DL (ref 0.5–1.4)
EPITHELIAL CELLS 1715: ABNORMAL /HPF (ref 0–5)
ERYTHROCYTE [DISTWIDTH] IN BLOOD BY AUTOMATED COUNT: 41.2 FL (ref 35.9–50)
GFR SERPLBLD CREATININE-BSD FMLA CKD-EPI: 97 ML/MIN/1.73 M 2
GLOBULIN SER CALC-MCNC: 2 G/DL (ref 1.9–3.5)
GLUCOSE SERPL-MCNC: 112 MG/DL (ref 65–99)
GLUCOSE UR STRIP.AUTO-MCNC: NEGATIVE MG/DL
HAV IGM SERPL QL IA: NORMAL
HBV CORE IGM SER QL: NORMAL
HBV SURFACE AG SER QL: NORMAL
HCT VFR BLD AUTO: 40.4 % (ref 37–47)
HCV AB SER QL: NORMAL
HGB BLD-MCNC: 13.3 G/DL (ref 12–16)
KETONES UR STRIP.AUTO-MCNC: ABNORMAL MG/DL
LACTATE SERPL-SCNC: 0.8 MMOL/L (ref 0.5–2)
LEUKOCYTE ESTERASE UR QL STRIP.AUTO: NEGATIVE
MCH RBC QN AUTO: 31 PG (ref 27–33)
MCHC RBC AUTO-ENTMCNC: 32.9 G/DL (ref 32.2–35.5)
MCV RBC AUTO: 94.2 FL (ref 81.4–97.8)
MICRO URNS: ABNORMAL
NITRITE UR QL STRIP.AUTO: NEGATIVE
PH UR STRIP.AUTO: 8 [PH] (ref 5–8)
PLATELET # BLD AUTO: 205 K/UL (ref 164–446)
PMV BLD AUTO: 11.3 FL (ref 9–12.9)
POTASSIUM SERPL-SCNC: 3.6 MMOL/L (ref 3.6–5.5)
PROT SERPL-MCNC: 5.8 G/DL (ref 6–8.2)
PROT UR QL STRIP: NEGATIVE MG/DL
RBC # BLD AUTO: 4.29 M/UL (ref 4.2–5.4)
RBC # URNS HPF: ABNORMAL /HPF
RBC UR QL AUTO: NEGATIVE
SODIUM SERPL-SCNC: 141 MMOL/L (ref 135–145)
SP GR UR STRIP.AUTO: 1.01
UROBILINOGEN UR STRIP.AUTO-MCNC: 1 EU/DL
WBC # BLD AUTO: 12.3 K/UL (ref 4.8–10.8)
WBC #/AREA URNS HPF: ABNORMAL /HPF

## 2025-03-22 PROCEDURE — 99233 SBSQ HOSP IP/OBS HIGH 50: CPT | Mod: MISDOCU | Performed by: INTERNAL MEDICINE

## 2025-03-22 PROCEDURE — 99239 HOSP IP/OBS DSCHRG MGMT >30: CPT | Performed by: INTERNAL MEDICINE

## 2025-03-22 PROCEDURE — 770001 HCHG ROOM/CARE - MED/SURG/GYN PRIV*

## 2025-03-22 PROCEDURE — 99285 EMERGENCY DEPT VISIT HI MDM: CPT

## 2025-03-22 PROCEDURE — 87040 BLOOD CULTURE FOR BACTERIA: CPT | Mod: 91

## 2025-03-22 PROCEDURE — 94760 N-INVAS EAR/PLS OXIMETRY 1: CPT

## 2025-03-22 PROCEDURE — 700105 HCHG RX REV CODE 258: Performed by: INTERNAL MEDICINE

## 2025-03-22 PROCEDURE — 36415 COLL VENOUS BLD VENIPUNCTURE: CPT

## 2025-03-22 PROCEDURE — 700111 HCHG RX REV CODE 636 W/ 250 OVERRIDE (IP): Mod: TB | Performed by: INTERNAL MEDICINE

## 2025-03-22 PROCEDURE — 74181 MRI ABDOMEN W/O CONTRAST: CPT

## 2025-03-22 PROCEDURE — 96374 THER/PROPH/DIAG INJ IV PUSH: CPT

## 2025-03-22 PROCEDURE — 80053 COMPREHEN METABOLIC PANEL: CPT

## 2025-03-22 PROCEDURE — 700111 HCHG RX REV CODE 636 W/ 250 OVERRIDE (IP): Mod: JZ | Performed by: INTERNAL MEDICINE

## 2025-03-22 PROCEDURE — 96376 TX/PRO/DX INJ SAME DRUG ADON: CPT

## 2025-03-22 PROCEDURE — 74177 CT ABD & PELVIS W/CONTRAST: CPT

## 2025-03-22 PROCEDURE — 700105 HCHG RX REV CODE 258: Performed by: HOSPITALIST

## 2025-03-22 PROCEDURE — 85027 COMPLETE CBC AUTOMATED: CPT

## 2025-03-22 PROCEDURE — 83605 ASSAY OF LACTIC ACID: CPT

## 2025-03-22 PROCEDURE — 700117 HCHG RX CONTRAST REV CODE 255: Performed by: HOSPITALIST

## 2025-03-22 PROCEDURE — 96375 TX/PRO/DX INJ NEW DRUG ADDON: CPT

## 2025-03-22 PROCEDURE — 700111 HCHG RX REV CODE 636 W/ 250 OVERRIDE (IP): Mod: JZ | Performed by: HOSPITALIST

## 2025-03-22 RX ORDER — HYDROMORPHONE HYDROCHLORIDE 1 MG/ML
0.5 INJECTION, SOLUTION INTRAMUSCULAR; INTRAVENOUS; SUBCUTANEOUS
Status: CANCELLED | OUTPATIENT
Start: 2025-03-22

## 2025-03-22 RX ORDER — OXYCODONE HYDROCHLORIDE 5 MG/1
5-10 TABLET ORAL EVERY 4 HOURS PRN
Refills: 0 | Status: DISCONTINUED | OUTPATIENT
Start: 2025-03-22 | End: 2025-03-22 | Stop reason: HOSPADM

## 2025-03-22 RX ORDER — ONDANSETRON 2 MG/ML
4 INJECTION INTRAMUSCULAR; INTRAVENOUS EVERY 4 HOURS PRN
Status: DISCONTINUED | OUTPATIENT
Start: 2025-03-22 | End: 2025-03-25 | Stop reason: HOSPADM

## 2025-03-22 RX ORDER — SODIUM CHLORIDE 9 MG/ML
INJECTION, SOLUTION INTRAVENOUS CONTINUOUS
Status: DISCONTINUED | OUTPATIENT
Start: 2025-03-22 | End: 2025-03-24

## 2025-03-22 RX ORDER — HYDROMORPHONE HYDROCHLORIDE 1 MG/ML
1 INJECTION, SOLUTION INTRAMUSCULAR; INTRAVENOUS; SUBCUTANEOUS
Status: DISCONTINUED | OUTPATIENT
Start: 2025-03-22 | End: 2025-03-24

## 2025-03-22 RX ORDER — PROCHLORPERAZINE EDISYLATE 5 MG/ML
5-10 INJECTION INTRAMUSCULAR; INTRAVENOUS EVERY 4 HOURS PRN
Status: CANCELLED | OUTPATIENT
Start: 2025-03-22

## 2025-03-22 RX ORDER — OXYCODONE HYDROCHLORIDE 5 MG/1
5-10 TABLET ORAL EVERY 4 HOURS PRN
Refills: 0 | Status: DISCONTINUED | OUTPATIENT
Start: 2025-03-22 | End: 2025-03-24

## 2025-03-22 RX ORDER — PROCHLORPERAZINE EDISYLATE 5 MG/ML
5-10 INJECTION INTRAMUSCULAR; INTRAVENOUS EVERY 4 HOURS PRN
Status: DISCONTINUED | OUTPATIENT
Start: 2025-03-22 | End: 2025-03-25 | Stop reason: HOSPADM

## 2025-03-22 RX ORDER — PROMETHAZINE HYDROCHLORIDE 25 MG/1
12.5-25 TABLET ORAL EVERY 4 HOURS PRN
Status: DISCONTINUED | OUTPATIENT
Start: 2025-03-22 | End: 2025-03-25 | Stop reason: HOSPADM

## 2025-03-22 RX ORDER — OXYCODONE HYDROCHLORIDE 5 MG/1
5-10 TABLET ORAL EVERY 4 HOURS PRN
Refills: 0 | Status: CANCELLED | OUTPATIENT
Start: 2025-03-22

## 2025-03-22 RX ORDER — PROMETHAZINE HYDROCHLORIDE 12.5 MG/1
12.5-25 SUPPOSITORY RECTAL EVERY 4 HOURS PRN
Status: DISCONTINUED | OUTPATIENT
Start: 2025-03-22 | End: 2025-03-25 | Stop reason: HOSPADM

## 2025-03-22 RX ORDER — AMOXICILLIN 250 MG
2 CAPSULE ORAL EVERY EVENING
Status: CANCELLED | OUTPATIENT
Start: 2025-03-23

## 2025-03-22 RX ORDER — SODIUM CHLORIDE 9 MG/ML
INJECTION, SOLUTION INTRAVENOUS CONTINUOUS
Status: CANCELLED | OUTPATIENT
Start: 2025-03-22

## 2025-03-22 RX ORDER — AMOXICILLIN 250 MG
2 CAPSULE ORAL EVERY EVENING
Status: DISCONTINUED | OUTPATIENT
Start: 2025-03-23 | End: 2025-03-25 | Stop reason: HOSPADM

## 2025-03-22 RX ORDER — HYDROMORPHONE HYDROCHLORIDE 1 MG/ML
0.5 INJECTION, SOLUTION INTRAMUSCULAR; INTRAVENOUS; SUBCUTANEOUS
Status: DISCONTINUED | OUTPATIENT
Start: 2025-03-22 | End: 2025-03-24

## 2025-03-22 RX ORDER — ONDANSETRON 4 MG/1
4 TABLET, ORALLY DISINTEGRATING ORAL EVERY 4 HOURS PRN
Status: DISCONTINUED | OUTPATIENT
Start: 2025-03-22 | End: 2025-03-25 | Stop reason: HOSPADM

## 2025-03-22 RX ORDER — PROMETHAZINE HYDROCHLORIDE 25 MG/1
12.5-25 SUPPOSITORY RECTAL EVERY 4 HOURS PRN
Status: CANCELLED | OUTPATIENT
Start: 2025-03-22

## 2025-03-22 RX ORDER — HYDROMORPHONE HYDROCHLORIDE 1 MG/ML
1 INJECTION, SOLUTION INTRAMUSCULAR; INTRAVENOUS; SUBCUTANEOUS
Status: CANCELLED | OUTPATIENT
Start: 2025-03-22

## 2025-03-22 RX ORDER — ONDANSETRON 4 MG/1
4 TABLET, ORALLY DISINTEGRATING ORAL EVERY 4 HOURS PRN
Status: CANCELLED | OUTPATIENT
Start: 2025-03-22

## 2025-03-22 RX ORDER — POLYETHYLENE GLYCOL 3350 17 G/17G
1 POWDER, FOR SOLUTION ORAL
Status: CANCELLED | OUTPATIENT
Start: 2025-03-22

## 2025-03-22 RX ORDER — PROMETHAZINE HYDROCHLORIDE 25 MG/1
12.5-25 TABLET ORAL EVERY 4 HOURS PRN
Status: CANCELLED | OUTPATIENT
Start: 2025-03-22

## 2025-03-22 RX ORDER — ONDANSETRON 2 MG/ML
4 INJECTION INTRAMUSCULAR; INTRAVENOUS EVERY 4 HOURS PRN
Status: CANCELLED | OUTPATIENT
Start: 2025-03-22

## 2025-03-22 RX ORDER — SODIUM CHLORIDE 9 MG/ML
500 INJECTION, SOLUTION INTRAVENOUS ONCE
Status: COMPLETED | OUTPATIENT
Start: 2025-03-22 | End: 2025-03-22

## 2025-03-22 RX ORDER — POLYETHYLENE GLYCOL 3350 17 G/17G
1 POWDER, FOR SOLUTION ORAL
Status: DISCONTINUED | OUTPATIENT
Start: 2025-03-22 | End: 2025-03-25 | Stop reason: HOSPADM

## 2025-03-22 RX ADMIN — HYDROMORPHONE HYDROCHLORIDE 1 MG: 1 INJECTION, SOLUTION INTRAMUSCULAR; INTRAVENOUS; SUBCUTANEOUS at 20:52

## 2025-03-22 RX ADMIN — HYDROMORPHONE HYDROCHLORIDE 1 MG: 1 INJECTION, SOLUTION INTRAMUSCULAR; INTRAVENOUS; SUBCUTANEOUS at 11:48

## 2025-03-22 RX ADMIN — HYDROMORPHONE HYDROCHLORIDE 1 MG: 1 INJECTION, SOLUTION INTRAMUSCULAR; INTRAVENOUS; SUBCUTANEOUS at 07:31

## 2025-03-22 RX ADMIN — SODIUM CHLORIDE 500 ML: 9 INJECTION, SOLUTION INTRAVENOUS at 17:45

## 2025-03-22 RX ADMIN — HYDROMORPHONE HYDROCHLORIDE 0.5 MG: 1 INJECTION, SOLUTION INTRAMUSCULAR; INTRAVENOUS; SUBCUTANEOUS at 03:09

## 2025-03-22 RX ADMIN — SODIUM CHLORIDE: 9 INJECTION, SOLUTION INTRAVENOUS at 00:00

## 2025-03-22 RX ADMIN — SODIUM CHLORIDE: 9 INJECTION, SOLUTION INTRAVENOUS at 06:51

## 2025-03-22 RX ADMIN — SODIUM CHLORIDE: 9 INJECTION, SOLUTION INTRAVENOUS at 20:56

## 2025-03-22 RX ADMIN — IOHEXOL 100 ML: 350 INJECTION, SOLUTION INTRAVENOUS at 00:13

## 2025-03-22 RX ADMIN — AMPICILLIN AND SULBACTAM 3 G: 1; 2 INJECTION, POWDER, FOR SOLUTION INTRAMUSCULAR; INTRAVENOUS at 18:40

## 2025-03-22 ASSESSMENT — ENCOUNTER SYMPTOMS
DIZZINESS: 0
HALLUCINATIONS: 0
FEVER: 0
MYALGIAS: 0
BACK PAIN: 0
SHORTNESS OF BREATH: 0
DEPRESSION: 0
FOCAL WEAKNESS: 0
ABDOMINAL PAIN: 1
NAUSEA: 1
HEARTBURN: 0
CHILLS: 0
SORE THROAT: 0
PALPITATIONS: 0
DIARRHEA: 0
VOMITING: 0
COUGH: 0
BLOOD IN STOOL: 0
WEAKNESS: 0
HEADACHES: 0

## 2025-03-22 ASSESSMENT — LIFESTYLE VARIABLES
HAVE PEOPLE ANNOYED YOU BY CRITICIZING YOUR DRINKING: NO
EVER FELT BAD OR GUILTY ABOUT YOUR DRINKING: NO
ON A TYPICAL DAY WHEN YOU DRINK ALCOHOL HOW MANY DRINKS DO YOU HAVE: 2
DOES PATIENT WANT TO STOP DRINKING: NO
EVER HAD A DRINK FIRST THING IN THE MORNING TO STEADY YOUR NERVES TO GET RID OF A HANGOVER: NO
TOTAL SCORE: 0
TOTAL SCORE: 0
EVER HAD A DRINK FIRST THING IN THE MORNING TO STEADY YOUR NERVES TO GET RID OF A HANGOVER: NO
TOTAL SCORE: 0
EVER FELT BAD OR GUILTY ABOUT YOUR DRINKING: NO
DOES PATIENT WANT TO STOP DRINKING: NO
TOTAL SCORE: 0
ALCOHOL_USE: YES
AVERAGE NUMBER OF DAYS PER WEEK YOU HAVE A DRINK CONTAINING ALCOHOL: 0
HAVE YOU EVER FELT YOU SHOULD CUT DOWN ON YOUR DRINKING: NO
AVERAGE NUMBER OF DAYS PER WEEK YOU HAVE A DRINK CONTAINING ALCOHOL: 0
TOTAL SCORE: 0
HOW MANY TIMES IN THE PAST YEAR HAVE YOU HAD 5 OR MORE DRINKS IN A DAY: 2
CONSUMPTION TOTAL: POSITIVE
HOW MANY TIMES IN THE PAST YEAR HAVE YOU HAD 5 OR MORE DRINKS IN A DAY: 2
ALCOHOL_USE: YES
HAVE PEOPLE ANNOYED YOU BY CRITICIZING YOUR DRINKING: NO
ON A TYPICAL DAY WHEN YOU DRINK ALCOHOL HOW MANY DRINKS DO YOU HAVE: 2
HAVE YOU EVER FELT YOU SHOULD CUT DOWN ON YOUR DRINKING: NO
TOTAL SCORE: 0
CONSUMPTION TOTAL: POSITIVE

## 2025-03-22 ASSESSMENT — COGNITIVE AND FUNCTIONAL STATUS - GENERAL
SUGGESTED CMS G CODE MODIFIER MOBILITY: CH
DAILY ACTIVITIY SCORE: 24
MOBILITY SCORE: 24
SUGGESTED CMS G CODE MODIFIER MOBILITY: CH
SUGGESTED CMS G CODE MODIFIER DAILY ACTIVITY: CH
SUGGESTED CMS G CODE MODIFIER DAILY ACTIVITY: CH
DAILY ACTIVITIY SCORE: 24
MOBILITY SCORE: 24

## 2025-03-22 ASSESSMENT — SOCIAL DETERMINANTS OF HEALTH (SDOH)
WITHIN THE LAST YEAR, HAVE YOU BEEN HUMILIATED OR EMOTIONALLY ABUSED IN OTHER WAYS BY YOUR PARTNER OR EX-PARTNER?: NO
WITHIN THE PAST 12 MONTHS, THE FOOD YOU BOUGHT JUST DIDN'T LAST AND YOU DIDN'T HAVE MONEY TO GET MORE: NEVER TRUE
WITHIN THE LAST YEAR, HAVE YOU BEEN KICKED, HIT, SLAPPED, OR OTHERWISE PHYSICALLY HURT BY YOUR PARTNER OR EX-PARTNER?: NO
WITHIN THE LAST YEAR, HAVE YOU BEEN HUMILIATED OR EMOTIONALLY ABUSED IN OTHER WAYS BY YOUR PARTNER OR EX-PARTNER?: NO
WITHIN THE LAST YEAR, HAVE TO BEEN RAPED OR FORCED TO HAVE ANY KIND OF SEXUAL ACTIVITY BY YOUR PARTNER OR EX-PARTNER?: NO
WITHIN THE PAST 12 MONTHS, YOU WORRIED THAT YOUR FOOD WOULD RUN OUT BEFORE YOU GOT THE MONEY TO BUY MORE: NEVER TRUE
WITHIN THE LAST YEAR, HAVE YOU BEEN KICKED, HIT, SLAPPED, OR OTHERWISE PHYSICALLY HURT BY YOUR PARTNER OR EX-PARTNER?: NO
WITHIN THE PAST 12 MONTHS, YOU WORRIED THAT YOUR FOOD WOULD RUN OUT BEFORE YOU GOT THE MONEY TO BUY MORE: NEVER TRUE
IN THE PAST 12 MONTHS, HAS THE ELECTRIC, GAS, OIL, OR WATER COMPANY THREATENED TO SHUT OFF SERVICE IN YOUR HOME?: NO
WITHIN THE LAST YEAR, HAVE TO BEEN RAPED OR FORCED TO HAVE ANY KIND OF SEXUAL ACTIVITY BY YOUR PARTNER OR EX-PARTNER?: NO
WITHIN THE LAST YEAR, HAVE YOU BEEN AFRAID OF YOUR PARTNER OR EX-PARTNER?: NO
IN THE PAST 12 MONTHS, HAS THE ELECTRIC, GAS, OIL, OR WATER COMPANY THREATENED TO SHUT OFF SERVICE IN YOUR HOME?: NO
WITHIN THE LAST YEAR, HAVE YOU BEEN AFRAID OF YOUR PARTNER OR EX-PARTNER?: NO
WITHIN THE PAST 12 MONTHS, THE FOOD YOU BOUGHT JUST DIDN'T LAST AND YOU DIDN'T HAVE MONEY TO GET MORE: NEVER TRUE

## 2025-03-22 ASSESSMENT — PATIENT HEALTH QUESTIONNAIRE - PHQ9
SUM OF ALL RESPONSES TO PHQ9 QUESTIONS 1 AND 2: 0
2. FEELING DOWN, DEPRESSED, IRRITABLE, OR HOPELESS: NOT AT ALL
2. FEELING DOWN, DEPRESSED, IRRITABLE, OR HOPELESS: NOT AT ALL
1. LITTLE INTEREST OR PLEASURE IN DOING THINGS: NOT AT ALL
1. LITTLE INTEREST OR PLEASURE IN DOING THINGS: NOT AT ALL
SUM OF ALL RESPONSES TO PHQ9 QUESTIONS 1 AND 2: 0

## 2025-03-22 ASSESSMENT — PAIN DESCRIPTION - PAIN TYPE
TYPE: ACUTE PAIN

## 2025-03-22 ASSESSMENT — FIBROSIS 4 INDEX
FIB4 SCORE: 7.99
FIB4 SCORE: 5.77

## 2025-03-22 NOTE — PROGRESS NOTES
4 Eyes Skin Assessment Completed by BRANNON Khan and BRANNON Tsai.    Head WDL  Ears WDL  Nose WDL  Mouth WDL  Neck WDL  Breast/Chest WDL  Shoulder Blades WDL  Spine WDL  (R) Arm/Elbow/Hand WDL  (L) Arm/Elbow/Hand WDL  Abdomen Scar  Groin WDL  Scrotum/Coccyx/Buttocks WDL  (R) Leg WDL  (L) Leg WDL  (R) Heel/Foot/Toe WDL  (L) Heel/Foot/Toe WDL          Devices In Places Pulse Ox      Interventions In Place N/A    Possible Skin Injury No    Pictures Uploaded Into Epic N/A  Wound Consult Placed N/A  RN Wound Prevention Protocol Ordered No

## 2025-03-22 NOTE — ASSESSMENT & PLAN NOTE
-ALT is 677 and .  Alk phos is 113 and total bilirubin 1.4.  -AST and ALT are down to the 500s, alk phos and bilirubin have normalized  -Pain is improving  -Patient does report prior history of elevated LFTs in the setting of pancreatitis post cholecystectomy but symptoms resolved prior ERCP was done.  -MRCP with CBD stone, ERCP Monday.  -Hepatitis panel is negative  -Additionally, patient is taking fluconazole for fungal infection in her nail.  Even though unlikely but this could also be causing LFTs elevation.  -CMP in the morning.

## 2025-03-22 NOTE — PROGRESS NOTES
Hospital Medicine Daily Progress Note    Date of Service  3/22/2025    Chief Complaint  Elva Asher is a 55 y.o. female admitted 3/21/2025 with abdominal pain    Hospital Course  History of gallstone pancreatitis in the past status post cholecystectomy with a history of post-cholecystectomy gallstone pancreatitis who presented with abdominal pain found to have gallstone pancreatitis.  She was admitted for supportive care and MRCP possible ERCP    Interval Problem Update  3/22: I reviewed her MRCP which shows a CBD stone with mild duct dilation.  Spoke with GI, earliest they can do ERCP is Monday.  Clear liquids, n.p.o. midnight.  LFTs and pain are improving    I have discussed this patient's plan of care and discharge plan at IDT rounds today with Case Management, Nursing, Nursing leadership, and other members of the IDT team.    Consultants/Specialty  GI    Code Status  Full Code    Disposition  The patient is not medically cleared for discharge to home or a post-acute facility.  Anticipate discharge to: home with close outpatient follow-up    I have placed the appropriate orders for post-discharge needs.    Review of Systems  Review of Systems   Constitutional:  Negative for chills, fever and malaise/fatigue.   HENT:  Negative for sore throat.    Respiratory:  Negative for cough and shortness of breath.    Cardiovascular:  Negative for chest pain and palpitations.   Gastrointestinal:  Positive for abdominal pain and nausea. Negative for blood in stool, diarrhea, heartburn and vomiting.   Genitourinary:  Negative for dysuria and frequency.   Musculoskeletal:  Negative for back pain and myalgias.   Neurological:  Negative for dizziness, focal weakness, weakness and headaches.   Psychiatric/Behavioral:  Negative for depression and hallucinations.    All other systems reviewed and are negative.       Physical Exam  Temp:  [36.7 °C (98.1 °F)-37.7 °C (99.8 °F)] 37.1 °C (98.8 °F)  Pulse:  [] 76  Resp:   [13-16] 16  BP: (104-140)/(55-86) 106/55  SpO2:  [90 %-98 %] 95 %    Physical Exam  Constitutional:       General: She is not in acute distress.  HENT:      Nose: Nose normal.      Mouth/Throat:      Mouth: Mucous membranes are dry.   Cardiovascular:      Rate and Rhythm: Normal rate and regular rhythm.      Pulses: Normal pulses.   Pulmonary:      Effort: Pulmonary effort is normal.      Breath sounds: Normal breath sounds.   Abdominal:      General: There is distension.      Palpations: Abdomen is soft.      Tenderness: There is guarding.   Musculoskeletal:         General: No swelling.      Cervical back: No muscular tenderness.   Lymphadenopathy:      Cervical: No cervical adenopathy.   Skin:     General: Skin is warm and dry.      Findings: No rash.   Neurological:      General: No focal deficit present.      Mental Status: She is alert and oriented to person, place, and time.      Motor: Weakness present.   Psychiatric:         Mood and Affect: Mood normal.         Thought Content: Thought content normal.         Fluids    Intake/Output Summary (Last 24 hours) at 3/22/2025 1231  Last data filed at 3/22/2025 1200  Gross per 24 hour   Intake 1183.39 ml   Output --   Net 1183.39 ml        Laboratory  Recent Labs     03/21/25 2036 03/22/25  0205   WBC 13.9* 12.3*   RBC 4.78 4.29   HEMOGLOBIN 14.8 13.3   HEMATOCRIT 45.5 40.4   MCV 95.2 94.2   MCH 31.0 31.0   MCHC 32.5 32.9   RDW 41.6 41.2   PLATELETCT 236 205   MPV 11.1 11.3     Recent Labs     03/21/25 2036 03/22/25  0205   SODIUM 141 141   POTASSIUM 3.9 3.6   CHLORIDE 103 106   CO2 23 23   GLUCOSE 118* 112*   BUN 12 11   CREATININE 0.86 0.74   CALCIUM 9.6 8.8                   Imaging  [id number]   Final Result         1. A stone in the distal CBD with mildly dilated CBD.      CT-ABDOMEN-PELVIS WITH   Final Result      1.  Mild acute pancreatitis involving the pancreatic head and body.      2.  Hepatic steatosis and mild splenomegaly.      DX-CHEST-PORTABLE  (1 VIEW)   Final Result      No acute cardiac or pulmonary abnormalities are identified.           Assessment/Plan  * Acute gallstone pancreatitis- (present on admission)  Assessment & Plan  Acute pancreatitis and elevated LFTs.  -Prior history of cholecystectomy but also 10 years ago, with a hx of post-cholecystectomy gallstone associated with pancreatitis (3y ago)  -Patient reports that they were planning for ERCP but her symptoms resolved and she was only on IV antibiotics.  -CT scan of the abdomen and pelvis shows mild peripancreatic inflammation  -I reviewed her MRCP done this morning, which shows a CBD stone with mild CBD dilation  -I spoke to GI the will consult however unable to do ERCP until Monday  -Pain control with IV Dilaudid, antiemetics  -Clear liquids  -N.p.o. on Sunday evening for ERCP Monday    Elevated LFTs  Assessment & Plan  -ALT is 677 and .  Alk phos is 113 and total bilirubin 1.4.  -AST and ALT are down to the 500s, alk phos and bilirubin have normalized  -Pain is improving  -Patient does report prior history of elevated LFTs in the setting of pancreatitis post cholecystectomy but symptoms resolved prior ERCP was done.  -MRCP with CBD stone, ERCP Monday.  -Hepatitis panel is negative  -Additionally, patient is taking fluconazole for fungal infection in her nail.  Even though unlikely but this could also be causing LFTs elevation.  -CMP in the morning.    Leukocytosis  Assessment & Plan  Due to pancreatitis, slightly trending down today  Repeat in a.m.         VTE prophylaxis:   SCDs/TEDs      I have performed a physical exam and reviewed and updated ROS and Plan today (3/22/2025). In review of yesterday's note (3/21/2025), there are no changes except as documented above.    Total time:  52 minutes.  I spent greater than 50% of the time for patient care, counseling, and coordination on this date, including unit/floor time, and face-to-face time with the patient as per interval events  and assessment and plan above

## 2025-03-22 NOTE — H&P
"Hospital Medicine History & Physical Note    Date of Service  3/21/2025    Primary Care Physician  Ángela Kilgore M.D.    Consultants  None    Code Status  Full Code    Chief Complaint  Chief Complaint   Patient presents with    Abdominal Pain     \" Like an innertube around my mid section \"  Acute onset 1100  Hx  GB pancreatitis and feels similar  Associated with N/V x 1 Chills and diaph No documented fever       History of Presenting Illness  Elva Asher is a 55 y.o. female, who presented to the emergency department on 3/21/2025 with complains of abdominal pain.  Symptoms started this morning, progressively worse.  She has a sharp epigastric pain rated as 8/10 in intensity.  She also reports that this pain feels \"very like when he had pancreatitis before \".  She does have a history of cholecystectomy.  Patient denies fever.    I discussed the plan of care with patient and ERP Dr. Hager .    Review of Systems  Review of Systems   Constitutional:  Negative for fever.   HENT:  Negative for congestion and sore throat.    Eyes:  Negative for blurred vision and double vision.   Respiratory:  Negative for cough and shortness of breath.    Cardiovascular:  Negative for chest pain and palpitations.   Gastrointestinal:  Negative for nausea and vomiting.   Genitourinary:  Negative for dysuria and urgency.   Musculoskeletal:  Negative for myalgias and neck pain.   Skin:  Negative for itching and rash.   Neurological:  Negative for dizziness, weakness and headaches.   Endo/Heme/Allergies:  Does not bruise/bleed easily.   Psychiatric/Behavioral:  Negative for depression. The patient does not have insomnia.        Past Medical History   has a past medical history of Pancreatitis due to biliary obstruction.    Surgical History   has a past surgical history that includes abdominal hysterectomy total; cholecystectomy; and oophorectomy (Right).     Family History  family history includes Cancer in her mother and another " family member; Diabetes in her father; Heart Disease in her maternal grandmother; Hyperlipidemia in her mother; Hypertension in her maternal grandmother; Other in her father.   Family history reviewed with patient. There is no family history that is pertinent to the chief complaint.     Social History   reports that she has never smoked. She has never used smokeless tobacco. She reports current alcohol use. She reports current drug use. Frequency: 2.00 times per week. Drugs: Marijuana and Inhaled.    Allergies  Allergies   Allergen Reactions    Metrogel [Metronidazole]        Medications  Prior to Admission Medications   Prescriptions Last Dose Informant Patient Reported? Taking?   Ascorbic Acid (VITAMIN C PO)   Yes No   Sig: Take  by mouth.   CALCIUM PO   Yes No   Sig: Take  by mouth.   Multiple Vitamin (MULTI-VITAMIN DAILY PO)   Yes No   Sig: Take  by mouth.   econazole nitrate 1 % cream   No No   Sig: Apply to affected toenails and surrounding skin   estradiol (ESTRACE) 0.1 MG/GM vaginal cream   No No   Sig: Insert 1 g intravaginally twice weekly.   fluconazole (DIFLUCAN) 200 MG Tab   No No   Sig: Take 1 Tablet by mouth every 7 days.      Facility-Administered Medications: None       Physical Exam  Temp:  [37.7 °C (99.8 °F)] 37.7 °C (99.8 °F)  Pulse:  [] 80  Resp:  [13-16] 13  BP: (118-140)/(56-86) 118/58  SpO2:  [96 %-98 %] 98 %  Blood Pressure: 118/58   Temperature: 37.7 °C (99.8 °F)   Pulse: 80   Respiration: 13   Pulse Oximetry: 98 %       Physical Exam  Constitutional:       Appearance: Normal appearance.   HENT:      Head: Normocephalic and atraumatic.      Mouth/Throat:      Mouth: Mucous membranes are moist.      Pharynx: Oropharynx is clear.   Eyes:      Extraocular Movements: Extraocular movements intact.      Pupils: Pupils are equal, round, and reactive to light.   Cardiovascular:      Rate and Rhythm: Normal rate and regular rhythm.      Heart sounds: Normal heart sounds.   Pulmonary:       Effort: Pulmonary effort is normal.      Breath sounds: Normal breath sounds.   Abdominal:      General: Abdomen is flat. Bowel sounds are normal.      Palpations: Abdomen is soft.      Tenderness: There is abdominal tenderness (Mild epigastric  pain on palpation).   Musculoskeletal:      Cervical back: Normal range of motion and neck supple.   Skin:     General: Skin is warm and dry.   Neurological:      General: No focal deficit present.      Mental Status: She is alert and oriented to person, place, and time.   Psychiatric:         Mood and Affect: Mood normal.         Behavior: Behavior normal.         Laboratory:  Recent Labs     03/21/25 2036   WBC 13.9*   RBC 4.78   HEMOGLOBIN 14.8   HEMATOCRIT 45.5   MCV 95.2   MCH 31.0   MCHC 32.5   RDW 41.6   PLATELETCT 236   MPV 11.1     Recent Labs     03/21/25 2036   SODIUM 141   POTASSIUM 3.9   CHLORIDE 103   CO2 23   GLUCOSE 118*   BUN 12   CREATININE 0.86   CALCIUM 9.6     Recent Labs     03/21/25 2036   ALTSGPT 677*   ASTSGOT 926*   ALKPHOSPHAT 113*   TBILIRUBIN 1.4   LIPASE 2481*   GLUCOSE 118*               Recent Labs     03/21/25 2036 03/21/25  2130   TROPONINT <6 <6       Imaging:  DX-CHEST-PORTABLE (1 VIEW)   Final Result      No acute cardiac or pulmonary abnormalities are identified.          X-Ray:  I have personally reviewed the images and compared with prior images. and My impression is: Portable chest xray no acute cardiopulmonary abnormality    Assessment/Plan:  Justification for Admission Status  I anticipate this patient will require at least two midnights for appropriate medical management, necessitating inpatient admission because 55-year-old female with acute pancreatitis and elevated LFTs        * Acute gallstone pancreatitis- (present on admission)  Assessment & Plan  -Inpatient status on medical floor.  -Patient presenting with acute pancreatitis and elevated LFTs.  -Prior history of cholecystectomy but also 10 years ago, had a  postcholecystectomy gallstone associated with pancreatitis.  Patient reports that they were planning for ERCP but her symptoms resolved and she was only on IV antibiotics.  -CT scan of the abdomen and pelvis is pending.  -I am starting her on IV fluids.  -Will keep her strict NPO.  -Pain control with IV narcotics.  -Ordered MRCP in the morning and repeat CMP and lipase numbers.    Leukocytosis  Assessment & Plan  -Likely reactive.  On admission WBC is 13.9.  I do not suspect any infection but will monitor CBC in the morning.    Elevated LFTs  Assessment & Plan  -ALT is 677 and .  Alk phos is 113 and total bilirubin 1.4.  -Patient does report prior history of elevated LFTs in the setting of pancreatitis post cholecystectomy but symptoms resolved prior ERCP was done.  -MRCP is ordered and pending.  -Additionally, patient is taking fluconazole for fungal infection in her nail.  Even though unlikely but this could also be causing LFTs elevation.  -I also added hepatitis panel.  Follow-up CMP in the morning.        VTE prophylaxis: SCDs/TEDs

## 2025-03-22 NOTE — ASSESSMENT & PLAN NOTE
Acute pancreatitis and elevated LFTs.  -Prior history of cholecystectomy but also 10 years ago, with a hx of post-cholecystectomy gallstone associated with pancreatitis (3y ago)  -Patient reports that they were planning for ERCP but her symptoms resolved and she was only on IV antibiotics.  -CT scan of the abdomen and pelvis shows mild peripancreatic inflammation  -I reviewed her MRCP done this morning, which shows a CBD stone with mild CBD dilation  -I spoke to GI the will consult however unable to do ERCP until Monday  -Pain control with IV Dilaudid, antiemetics  -Clear liquids  -N.p.o. on Sunday evening for ERCP Monday

## 2025-03-22 NOTE — PROGRESS NOTES
Pt arrived to [facility] hrs 0044, ambulatory, no signs of distress noted. IVF NS at 150 continued to floor. POC discussed, informed pt she is NPO. Instructed to use call light, pt verbalized understanding. Call light within reach. Safety and fall precautions in place. Care ongoing.

## 2025-03-22 NOTE — CARE PLAN
The patient is Stable - Low risk of patient condition declining or worsening    Shift Goals  Clinical Goals: Continue IVF, trend LFTs, complete MRI screening  Patient Goals: Rest, pain mgt    Progress made toward(s) clinical / shift goals:  IVF continued, pt received PRN pain meds per mar, MRI screening completed. Monitored lab results to check for trend in LFTs. Clustered care for optimal rest. Pt seen asleep with calm and unlabored respiration post interventions. Kept on  overnight.    Patient is not progressing towards the following goals:

## 2025-03-22 NOTE — ED PROVIDER NOTES
"ED Provider Note    CHIEF COMPLAINT  Chief Complaint   Patient presents with    Abdominal Pain     \" Like an innertube around my mid section \"  Acute onset 1100  Hx  GB pancreatitis and feels similar  Associated with N/V x 1 Chills and diaph No documented fever       EXTERNAL RECORDS REVIEWED  Family medicine outpatient note 3/18/2025 seen for a bump on her right ear.  Advised dermatology follow-up.  on 318 she had a CT cardiac, calcium score 0        HPI/ROS  LIMITATION TO HISTORY   Select: : None  OUTSIDE HISTORIAN(S):  None    Elva Asher is a 55 y.o. female who presents to the ER with acute onset epigastric pain and 1 episode of nonbloody nonbilious emesis.  She was gardening when the pain started.  Radiated to the back.  She had 1 stool that was more loose than usual but it was nonbloody, no melena.  She waited to see if the pain would resolve, did seem like it was slightly getting better but then this evening started getting worse again.  She has had some subjective chills no measured fevers.  No urinary symptoms.  She says this feels similar to previous episodes of gallstones and pancreatitis.  Has had a cholecystectomy and hysterectomy.    PAST MEDICAL HISTORY   has a past medical history of Pancreatitis due to biliary obstruction.    SURGICAL HISTORY   has a past surgical history that includes abdominal hysterectomy total; cholecystectomy; and oophorectomy (Right).    FAMILY HISTORY  Family History   Problem Relation Age of Onset    Hyperlipidemia Mother     Cancer Mother         melanoma    Diabetes Father     Other Father         hepatitis c    Heart Disease Maternal Grandmother     Hypertension Maternal Grandmother     Cancer Other         great uncle had melanoma    Stroke Neg Hx     Ovarian Cancer Neg Hx     Tubal Cancer Neg Hx     Peritoneal Cancer Neg Hx     Colorectal Cancer Neg Hx     Breast Cancer Neg Hx        SOCIAL HISTORY  Social History     Tobacco Use    Smoking status: Never    " "Smokeless tobacco: Never   Vaping Use    Vaping status: Former    Substances: THC   Substance and Sexual Activity    Alcohol use: Yes     Comment: Socially    Drug use: Yes     Frequency: 2.0 times per week     Types: Marijuana, Inhaled     Comment: Help sleep and stop night sweats    Sexual activity: Yes     Partners: Male     Birth control/protection: Post-Menopausal     Comment:   [place]       CURRENT MEDICATIONS  Home Medications    **Home medications have not yet been reviewed for this encounter**       Audit from Redirected Encounters    **Home medications have not yet been reviewed for this encounter**         ALLERGIES  Allergies   Allergen Reactions    Metrogel [Metronidazole]        PHYSICAL EXAM  VITAL SIGNS: /58   Pulse 80   Temp 37.7 °C (99.8 °F) (Temporal)   Resp 13   Ht 1.753 m (5' 9\")   Wt 89.3 kg (196 lb 13.9 oz)   SpO2 98%   BMI 29.07 kg/m²    General: Sitting in stretcher comfortably, no distress  HEENT: Moist mucous membranes, normal conjunctiva  CV: Regular rate rhythm no murmurs, 2+ radial pulses  Abdomen: Soft nondistended, tender to palpation throughout the upper abdomen most severe in the epigastric region  MSK: No swelling in the lower extremities    EKG/LABS  CBC leukocytosis 13.9.  Other cell counts normal.  CMP with transaminitis  .  Bilirubin normal, minimal elevation in alk phos 113.  Electrolytes and renal function normal.  Lipase elevated 2481.  Troponin undetectable.    EKG 2129 NSR rate 77 normal axis normal intervals QTc 401 ms, no Q waves, no signs of pericarditis, no T wave inversions or STEMI.  I have independently interpreted this EKG    RADIOLOGY/PROCEDURES   I have independently interpreted the diagnostic imaging associated with this visit and am waiting the final reading from the radiologist.   My preliminary interpretation is as follows: Clear lung fields normal cardiomediastinal silhouette, no free air under the " diaphragm.    Radiologist interpretation:  DX-CHEST-PORTABLE (1 VIEW)   Final Result      No acute cardiac or pulmonary abnormalities are identified.          COURSE & MEDICAL DECISION MAKING    ASSESSMENT, COURSE AND PLAN  Care Narrative: Differential includes pancreatitis, PUD, gastritis, bili obstruction, hepatitis, aortic dissection/aneurysm, bowel obstruction    On arrival patient is noted to be tachycardic I suspect this may be secondary to pain possibly mild dehydration.  She has tenderness in the upper abdomen most significant in the epigastric region.  No guarding or signs of peritonitis no surgical abdomen.  Differential above considered.  She is given Zofran, Pepcid and 1 L LR bolus initially.  After these interventions she was feeling noticeably better.  She does have a slight leukocytosis 13.9 on CBC.  May be somewhat stress related versus mild dehydration.  Troponin is undetectable.  EKG is nonischemic.  Heart score 2 unlikely cardiac cause of her symptoms.    On review of her labs she does have evidence of acute pancreatitis lipase is 2481.  She has a transaminitis as well but there is no obstructive pattern, bilirubin is normal, and significant elevation in alk phos.  She is already had a cholecystectomy as well.  Obstruction is on differential, I recommend she get MRCP.  Still having pain at this time, patient okay with admission for pain control and nausea control, IV hydration and further workup.  Will get CT abdomen pelvis tonight to help guide management.  Spoke with the hospitalist Dr. Fuller who accepted patient in stable condition.    CHEST PAIN:   HEART Score for Major Cardiac Events  HEART Score     History: Slightly suspicious  ECG: Normal  Age: 45-64  Risk Factors: 1-2 risk factors  Troponin: Less than or equal to normal limit    Heart Score: 2    Total Score   0-3 Points = Low Score, risk of MACE 0.9-1.7%.  4-6 Points = Moderate Score, risk of MACE 12-16.6%  7-10 Points = High Score, risk  of MACE 50-65%        DISPOSITION AND DISCUSSIONS  I have discussed management of the patient with the following physicians and HECTOR's:  Dr. Fuller hospitalist    Discussion of management with other \[facility]\"" or appropriate source(s): None         FINAL DIAGNOSIS  1. Epigastric pain    2. Acute pancreatitis, unspecified complication status, unspecified pancreatitis type    3. Hepatitis         Electronically signed by: Jarod Larose M.D., 3/21/2025 8:55 PM

## 2025-03-22 NOTE — PROGRESS NOTES
Elva Asher had a remote cholecystectomy.  She developed severe epigastric pain on March 21 at 1100.    ER evaluation found WBC 13,900.  LT: 113--926--677--1.4 (0.1--1.5).  Lipase: 2481.    She was admitted with a diagnosis of acute pancreatitis.      Labs  CBC: WBC 13,900, then declined to 12,300.  CMP: Elevated liver tests, otherwise unremarkable.    3/21: LT: 113--926--677--1.4, with lipase 2481.  3/22: LT: 94--516--535--0.8    Acute hepatitis panel: Negative.      Imaging  CXR: No acute disease.    APCT 3/21: Fatty liver.  Slightly enlarged pancreatic head and body attributed to acute pancreatitis.  Absent gallbladder.    MRCP: Mildly dilated CBD with diameter 8 mm.  4 mm distal CBD stone.  Absent gallbladder.  Mildly prominent pancreatic head.        [facility] does not have a physician with ERCP privileges this weekend.    Dr. Wiliam Lundberg kindly discussed this patient's case with me, to determine the optimal timing for ERCP.  If a patient with acute pancreatitis has cholangitis, then urgent ERCP is indicated.  If not, he usually waits 48 hours before proceeding with ERCP.     For now we plan to observe her course on conservative treatment, and plan for ERCP on March 24.  Dr Lundberg would be amenable for transfer if urgent ERCP becomes necessary.

## 2025-03-22 NOTE — HOSPITAL COURSE
History of gallstone pancreatitis in the past status post cholecystectomy with a history of post-cholecystectomy gallstone pancreatitis who presented with abdominal pain found to have gallstone pancreatitis.  She was admitted for supportive care and MRCP possible ERCP

## 2025-03-23 ENCOUNTER — ANESTHESIA (OUTPATIENT)
Dept: SURGERY | Facility: MEDICAL CENTER | Age: 55
DRG: 444 | End: 2025-03-23
Payer: OTHER GOVERNMENT

## 2025-03-23 ENCOUNTER — APPOINTMENT (OUTPATIENT)
Dept: RADIOLOGY | Facility: MEDICAL CENTER | Age: 55
DRG: 444 | End: 2025-03-23
Attending: INTERNAL MEDICINE
Payer: OTHER GOVERNMENT

## 2025-03-23 PROBLEM — E87.6 HYPOKALEMIA: Status: ACTIVE | Noted: 2025-03-23

## 2025-03-23 LAB
ALBUMIN SERPL BCP-MCNC: 3.1 G/DL (ref 3.2–4.9)
ALBUMIN/GLOB SERPL: 1.5 G/DL
ALP SERPL-CCNC: 78 U/L (ref 30–99)
ALT SERPL-CCNC: 288 U/L (ref 2–50)
ANION GAP SERPL CALC-SCNC: 13 MMOL/L (ref 7–16)
AST SERPL-CCNC: 130 U/L (ref 12–45)
BILIRUB SERPL-MCNC: 0.8 MG/DL (ref 0.1–1.5)
BUN SERPL-MCNC: 13 MG/DL (ref 8–22)
CALCIUM ALBUM COR SERPL-MCNC: 8.6 MG/DL (ref 8.5–10.5)
CALCIUM SERPL-MCNC: 7.9 MG/DL (ref 8.5–10.5)
CHLORIDE SERPL-SCNC: 108 MMOL/L (ref 96–112)
CO2 SERPL-SCNC: 18 MMOL/L (ref 20–33)
CREAT SERPL-MCNC: 0.72 MG/DL (ref 0.5–1.4)
ERYTHROCYTE [DISTWIDTH] IN BLOOD BY AUTOMATED COUNT: 44.2 FL (ref 35.9–50)
GFR SERPLBLD CREATININE-BSD FMLA CKD-EPI: 100 ML/MIN/1.73 M 2
GLOBULIN SER CALC-MCNC: 2.1 G/DL (ref 1.9–3.5)
GLUCOSE SERPL-MCNC: 76 MG/DL (ref 65–99)
HCT VFR BLD AUTO: 32.4 % (ref 37–47)
HGB BLD-MCNC: 10.6 G/DL (ref 12–16)
HOLDING TUBE BB 8507: NORMAL
MAGNESIUM SERPL-MCNC: 1.9 MG/DL (ref 1.5–2.5)
MCH RBC QN AUTO: 31.8 PG (ref 27–33)
MCHC RBC AUTO-ENTMCNC: 32.7 G/DL (ref 32.2–35.5)
MCV RBC AUTO: 97.3 FL (ref 81.4–97.8)
PLATELET # BLD AUTO: 124 K/UL (ref 164–446)
PMV BLD AUTO: 10.7 FL (ref 9–12.9)
POTASSIUM SERPL-SCNC: 3.3 MMOL/L (ref 3.6–5.5)
PROT SERPL-MCNC: 5.2 G/DL (ref 6–8.2)
RBC # BLD AUTO: 3.33 M/UL (ref 4.2–5.4)
SODIUM SERPL-SCNC: 139 MMOL/L (ref 135–145)
WBC # BLD AUTO: 5.5 K/UL (ref 4.8–10.8)

## 2025-03-23 PROCEDURE — A9270 NON-COVERED ITEM OR SERVICE: HCPCS | Performed by: INTERNAL MEDICINE

## 2025-03-23 PROCEDURE — 700105 HCHG RX REV CODE 258: Performed by: INTERNAL MEDICINE

## 2025-03-23 PROCEDURE — 74018 RADEX ABDOMEN 1 VIEW: CPT

## 2025-03-23 PROCEDURE — 700111 HCHG RX REV CODE 636 W/ 250 OVERRIDE (IP): Mod: TB | Performed by: INTERNAL MEDICINE

## 2025-03-23 PROCEDURE — 85027 COMPLETE CBC AUTOMATED: CPT

## 2025-03-23 PROCEDURE — 770020 HCHG ROOM/CARE - TELE (206)

## 2025-03-23 PROCEDURE — 700117 HCHG RX CONTRAST REV CODE 255: Performed by: INTERNAL MEDICINE

## 2025-03-23 PROCEDURE — 160002 HCHG RECOVERY MINUTES (STAT): Performed by: INTERNAL MEDICINE

## 2025-03-23 PROCEDURE — 0FC98ZZ EXTIRPATION OF MATTER FROM COMMON BILE DUCT, VIA NATURAL OR ARTIFICIAL OPENING ENDOSCOPIC: ICD-10-PCS | Performed by: INTERNAL MEDICINE

## 2025-03-23 PROCEDURE — 160028 HCHG SURGERY MINUTES - 1ST 30 MINS LEVEL 3: Performed by: INTERNAL MEDICINE

## 2025-03-23 PROCEDURE — 36415 COLL VENOUS BLD VENIPUNCTURE: CPT

## 2025-03-23 PROCEDURE — 80053 COMPREHEN METABOLIC PANEL: CPT

## 2025-03-23 PROCEDURE — 83735 ASSAY OF MAGNESIUM: CPT

## 2025-03-23 PROCEDURE — 99233 SBSQ HOSP IP/OBS HIGH 50: CPT | Performed by: INTERNAL MEDICINE

## 2025-03-23 PROCEDURE — 700111 HCHG RX REV CODE 636 W/ 250 OVERRIDE (IP): Performed by: STUDENT IN AN ORGANIZED HEALTH CARE EDUCATION/TRAINING PROGRAM

## 2025-03-23 PROCEDURE — 700101 HCHG RX REV CODE 250: Performed by: STUDENT IN AN ORGANIZED HEALTH CARE EDUCATION/TRAINING PROGRAM

## 2025-03-23 PROCEDURE — C1889 IMPLANT/INSERT DEVICE, NOC: HCPCS | Performed by: INTERNAL MEDICINE

## 2025-03-23 PROCEDURE — 160035 HCHG PACU - 1ST 60 MINS PHASE I: Performed by: INTERNAL MEDICINE

## 2025-03-23 PROCEDURE — 700102 HCHG RX REV CODE 250 W/ 637 OVERRIDE(OP): Mod: JZ

## 2025-03-23 PROCEDURE — 700102 HCHG RX REV CODE 250 W/ 637 OVERRIDE(OP): Performed by: INTERNAL MEDICINE

## 2025-03-23 PROCEDURE — 160039 HCHG SURGERY MINUTES - EA ADDL 1 MIN LEVEL 3: Performed by: INTERNAL MEDICINE

## 2025-03-23 PROCEDURE — A9270 NON-COVERED ITEM OR SERVICE: HCPCS | Mod: JZ

## 2025-03-23 PROCEDURE — 160048 HCHG OR STATISTICAL LEVEL 1-5: Performed by: INTERNAL MEDICINE

## 2025-03-23 PROCEDURE — 700105 HCHG RX REV CODE 258: Performed by: STUDENT IN AN ORGANIZED HEALTH CARE EDUCATION/TRAINING PROGRAM

## 2025-03-23 PROCEDURE — 160009 HCHG ANES TIME/MIN: Performed by: INTERNAL MEDICINE

## 2025-03-23 PROCEDURE — C1769 GUIDE WIRE: HCPCS | Performed by: INTERNAL MEDICINE

## 2025-03-23 PROCEDURE — 700102 HCHG RX REV CODE 250 W/ 637 OVERRIDE(OP): Performed by: STUDENT IN AN ORGANIZED HEALTH CARE EDUCATION/TRAINING PROGRAM

## 2025-03-23 PROCEDURE — A9270 NON-COVERED ITEM OR SERVICE: HCPCS | Performed by: STUDENT IN AN ORGANIZED HEALTH CARE EDUCATION/TRAINING PROGRAM

## 2025-03-23 PROCEDURE — 160015 HCHG STAT PREOP MINUTES: Performed by: INTERNAL MEDICINE

## 2025-03-23 RX ORDER — HYDRALAZINE HYDROCHLORIDE 20 MG/ML
5 INJECTION INTRAMUSCULAR; INTRAVENOUS
Status: DISCONTINUED | OUTPATIENT
Start: 2025-03-23 | End: 2025-03-23 | Stop reason: HOSPADM

## 2025-03-23 RX ORDER — DEXAMETHASONE SODIUM PHOSPHATE 4 MG/ML
INJECTION, SOLUTION INTRA-ARTICULAR; INTRALESIONAL; INTRAMUSCULAR; INTRAVENOUS; SOFT TISSUE PRN
Status: DISCONTINUED | OUTPATIENT
Start: 2025-03-23 | End: 2025-03-23 | Stop reason: SURG

## 2025-03-23 RX ORDER — ONDANSETRON 2 MG/ML
INJECTION INTRAMUSCULAR; INTRAVENOUS PRN
Status: DISCONTINUED | OUTPATIENT
Start: 2025-03-23 | End: 2025-03-23 | Stop reason: SURG

## 2025-03-23 RX ORDER — HALOPERIDOL 5 MG/ML
1 INJECTION INTRAMUSCULAR
Status: DISCONTINUED | OUTPATIENT
Start: 2025-03-23 | End: 2025-03-23 | Stop reason: HOSPADM

## 2025-03-23 RX ORDER — KETOROLAC TROMETHAMINE 15 MG/ML
15 INJECTION, SOLUTION INTRAMUSCULAR; INTRAVENOUS EVERY 6 HOURS PRN
Status: DISCONTINUED | OUTPATIENT
Start: 2025-03-23 | End: 2025-03-25 | Stop reason: HOSPADM

## 2025-03-23 RX ORDER — DIPHENHYDRAMINE HYDROCHLORIDE 50 MG/ML
12.5 INJECTION, SOLUTION INTRAMUSCULAR; INTRAVENOUS
Status: DISCONTINUED | OUTPATIENT
Start: 2025-03-23 | End: 2025-03-23 | Stop reason: HOSPADM

## 2025-03-23 RX ORDER — OXYCODONE HCL 5 MG/5 ML
10 SOLUTION, ORAL ORAL
Status: DISCONTINUED | OUTPATIENT
Start: 2025-03-23 | End: 2025-03-23 | Stop reason: HOSPADM

## 2025-03-23 RX ORDER — ROCURONIUM BROMIDE 10 MG/ML
INJECTION, SOLUTION INTRAVENOUS PRN
Status: DISCONTINUED | OUTPATIENT
Start: 2025-03-23 | End: 2025-03-23 | Stop reason: SURG

## 2025-03-23 RX ORDER — HYDROMORPHONE HYDROCHLORIDE 1 MG/ML
0.4 INJECTION, SOLUTION INTRAMUSCULAR; INTRAVENOUS; SUBCUTANEOUS
Status: DISCONTINUED | OUTPATIENT
Start: 2025-03-23 | End: 2025-03-23 | Stop reason: HOSPADM

## 2025-03-23 RX ORDER — SODIUM CHLORIDE, SODIUM LACTATE, POTASSIUM CHLORIDE, CALCIUM CHLORIDE 600; 310; 30; 20 MG/100ML; MG/100ML; MG/100ML; MG/100ML
INJECTION, SOLUTION INTRAVENOUS CONTINUOUS
Status: DISCONTINUED | OUTPATIENT
Start: 2025-03-23 | End: 2025-03-23 | Stop reason: HOSPADM

## 2025-03-23 RX ORDER — ONDANSETRON 2 MG/ML
4 INJECTION INTRAMUSCULAR; INTRAVENOUS
Status: DISCONTINUED | OUTPATIENT
Start: 2025-03-23 | End: 2025-03-23 | Stop reason: HOSPADM

## 2025-03-23 RX ORDER — HYDROMORPHONE HYDROCHLORIDE 1 MG/ML
0.1 INJECTION, SOLUTION INTRAMUSCULAR; INTRAVENOUS; SUBCUTANEOUS
Status: DISCONTINUED | OUTPATIENT
Start: 2025-03-23 | End: 2025-03-23 | Stop reason: HOSPADM

## 2025-03-23 RX ORDER — IPRATROPIUM BROMIDE AND ALBUTEROL SULFATE 2.5; .5 MG/3ML; MG/3ML
3 SOLUTION RESPIRATORY (INHALATION)
Status: DISCONTINUED | OUTPATIENT
Start: 2025-03-23 | End: 2025-03-23 | Stop reason: HOSPADM

## 2025-03-23 RX ORDER — OXYCODONE HCL 5 MG/5 ML
5 SOLUTION, ORAL ORAL
Status: DISCONTINUED | OUTPATIENT
Start: 2025-03-23 | End: 2025-03-23 | Stop reason: HOSPADM

## 2025-03-23 RX ORDER — POTASSIUM CHLORIDE 1500 MG/1
40 TABLET, EXTENDED RELEASE ORAL ONCE
Status: COMPLETED | OUTPATIENT
Start: 2025-03-23 | End: 2025-03-23

## 2025-03-23 RX ORDER — HYDROMORPHONE HYDROCHLORIDE 1 MG/ML
0.2 INJECTION, SOLUTION INTRAMUSCULAR; INTRAVENOUS; SUBCUTANEOUS
Status: DISCONTINUED | OUTPATIENT
Start: 2025-03-23 | End: 2025-03-23 | Stop reason: HOSPADM

## 2025-03-23 RX ORDER — ACETAMINOPHEN 325 MG/1
650 TABLET ORAL ONCE
Status: COMPLETED | OUTPATIENT
Start: 2025-03-23 | End: 2025-03-23

## 2025-03-23 RX ORDER — INDOMETHACIN 100 MG
100 SUPPOSITORY, RECTAL RECTAL ONCE
Status: COMPLETED | OUTPATIENT
Start: 2025-03-23 | End: 2025-03-23

## 2025-03-23 RX ORDER — MEPERIDINE HYDROCHLORIDE 25 MG/ML
12.5 INJECTION INTRAMUSCULAR; INTRAVENOUS; SUBCUTANEOUS
Status: DISCONTINUED | OUTPATIENT
Start: 2025-03-23 | End: 2025-03-23 | Stop reason: HOSPADM

## 2025-03-23 RX ORDER — LABETALOL HYDROCHLORIDE 5 MG/ML
5 INJECTION, SOLUTION INTRAVENOUS
Status: DISCONTINUED | OUTPATIENT
Start: 2025-03-23 | End: 2025-03-23 | Stop reason: HOSPADM

## 2025-03-23 RX ORDER — EPHEDRINE SULFATE 50 MG/ML
5 INJECTION, SOLUTION INTRAVENOUS
Status: DISCONTINUED | OUTPATIENT
Start: 2025-03-23 | End: 2025-03-23 | Stop reason: HOSPADM

## 2025-03-23 RX ORDER — SODIUM CHLORIDE, SODIUM LACTATE, POTASSIUM CHLORIDE, CALCIUM CHLORIDE 600; 310; 30; 20 MG/100ML; MG/100ML; MG/100ML; MG/100ML
INJECTION, SOLUTION INTRAVENOUS
Status: DISCONTINUED | OUTPATIENT
Start: 2025-03-23 | End: 2025-03-23 | Stop reason: SURG

## 2025-03-23 RX ORDER — LIDOCAINE HYDROCHLORIDE 20 MG/ML
INJECTION, SOLUTION EPIDURAL; INFILTRATION; INTRACAUDAL; PERINEURAL PRN
Status: DISCONTINUED | OUTPATIENT
Start: 2025-03-23 | End: 2025-03-23 | Stop reason: SURG

## 2025-03-23 RX ADMIN — SENNOSIDES AND DOCUSATE SODIUM 2 TABLET: 50; 8.6 TABLET ORAL at 17:50

## 2025-03-23 RX ADMIN — FENTANYL CITRATE 50 MCG: 50 INJECTION, SOLUTION INTRAMUSCULAR; INTRAVENOUS at 09:42

## 2025-03-23 RX ADMIN — ONDANSETRON 4 MG: 2 INJECTION INTRAMUSCULAR; INTRAVENOUS at 09:59

## 2025-03-23 RX ADMIN — DEXAMETHASONE SODIUM PHOSPHATE 4 MG: 4 INJECTION INTRA-ARTICULAR; INTRALESIONAL; INTRAMUSCULAR; INTRAVENOUS; SOFT TISSUE at 09:45

## 2025-03-23 RX ADMIN — SUGAMMADEX 200 MG: 100 INJECTION, SOLUTION INTRAVENOUS at 09:59

## 2025-03-23 RX ADMIN — POTASSIUM CHLORIDE 40 MEQ: 1500 TABLET, EXTENDED RELEASE ORAL at 05:46

## 2025-03-23 RX ADMIN — AMPICILLIN AND SULBACTAM 3 G: 1; 2 INJECTION, POWDER, FOR SOLUTION INTRAMUSCULAR; INTRAVENOUS at 23:00

## 2025-03-23 RX ADMIN — AMPICILLIN AND SULBACTAM 3 G: 1; 2 INJECTION, POWDER, FOR SOLUTION INTRAMUSCULAR; INTRAVENOUS at 05:50

## 2025-03-23 RX ADMIN — ROCURONIUM BROMIDE 50 MG: 10 INJECTION INTRAVENOUS at 09:42

## 2025-03-23 RX ADMIN — OXYCODONE 10 MG: 5 TABLET ORAL at 22:00

## 2025-03-23 RX ADMIN — LIDOCAINE HYDROCHLORIDE 80 MG: 20 INJECTION, SOLUTION EPIDURAL; INFILTRATION; INTRACAUDAL; PERINEURAL at 09:42

## 2025-03-23 RX ADMIN — Medication 100 MG: at 08:59

## 2025-03-23 RX ADMIN — HYDROMORPHONE HYDROCHLORIDE 0.5 MG: 1 INJECTION, SOLUTION INTRAMUSCULAR; INTRAVENOUS; SUBCUTANEOUS at 02:52

## 2025-03-23 RX ADMIN — SODIUM CHLORIDE, POTASSIUM CHLORIDE, SODIUM LACTATE AND CALCIUM CHLORIDE: 600; 310; 30; 20 INJECTION, SOLUTION INTRAVENOUS at 09:36

## 2025-03-23 RX ADMIN — SODIUM CHLORIDE: 9 INJECTION, SOLUTION INTRAVENOUS at 12:31

## 2025-03-23 RX ADMIN — PROPOFOL 150 MG: 10 INJECTION, EMULSION INTRAVENOUS at 09:42

## 2025-03-23 RX ADMIN — AMPICILLIN AND SULBACTAM 3 G: 1; 2 INJECTION, POWDER, FOR SOLUTION INTRAMUSCULAR; INTRAVENOUS at 12:34

## 2025-03-23 RX ADMIN — AMPICILLIN AND SULBACTAM 3 G: 1; 2 INJECTION, POWDER, FOR SOLUTION INTRAMUSCULAR; INTRAVENOUS at 17:51

## 2025-03-23 RX ADMIN — AMPICILLIN AND SULBACTAM 3 G: 1; 2 INJECTION, POWDER, FOR SOLUTION INTRAMUSCULAR; INTRAVENOUS at 00:06

## 2025-03-23 RX ADMIN — ACETAMINOPHEN 650 MG: 325 TABLET ORAL at 00:25

## 2025-03-23 ASSESSMENT — ENCOUNTER SYMPTOMS
FEVER: 0
DOUBLE VISION: 0
VOMITING: 0
NAUSEA: 0
COUGH: 0
HEADACHES: 0
SHORTNESS OF BREATH: 0
SEIZURES: 0
BLURRED VISION: 0
CHILLS: 0
PALPITATIONS: 0
SORE THROAT: 0
BACK PAIN: 0
ABDOMINAL PAIN: 1

## 2025-03-23 ASSESSMENT — COGNITIVE AND FUNCTIONAL STATUS - GENERAL
DAILY ACTIVITIY SCORE: 24
MOBILITY SCORE: 24
SUGGESTED CMS G CODE MODIFIER DAILY ACTIVITY: CH
SUGGESTED CMS G CODE MODIFIER MOBILITY: CH

## 2025-03-23 ASSESSMENT — PAIN DESCRIPTION - PAIN TYPE
TYPE: ACUTE PAIN

## 2025-03-23 ASSESSMENT — FIBROSIS 4 INDEX: FIB4 SCORE: 3.27

## 2025-03-23 ASSESSMENT — PAIN SCALES - GENERAL: PAIN_LEVEL: 2

## 2025-03-23 NOTE — ANESTHESIA PREPROCEDURE EVALUATION
Case: [id number] Date/Time: 03/23/25 0907    Procedure: ERCP (ENDOSCOPIC RETROGRADE CHOLANGIOPANCREATOGRAPHY)    Location: [facility] OR 06 / SURGERY [facility]    Surgeons: Wiliam Lundberg M.D.          54 yo F w/ gallstrone pancreatitis s/p cholecystectomy    Relevant Problems   CARDIAC   (positive) Hot flashes       Physical Exam    Airway   Mallampati: II  TM distance: >3 FB  Neck ROM: full       Cardiovascular - normal exam  Rhythm: regular  Rate: normal  (-) murmur     Dental - normal exam           Pulmonary - normal exam  Breath sounds clear to auscultation     Abdominal    Neurological - normal exam                   Anesthesia Plan    ASA 2       Plan - general       Airway plan will be ETT          Induction: intravenous    Postoperative Plan: Postoperative administration of opioids is intended.    Pertinent diagnostic labs and testing reviewed    Informed Consent:    Anesthetic plan and risks discussed with patient.    Use of blood products discussed with: patient whom consented to blood products.

## 2025-03-23 NOTE — H&P
"Discharge Summary / Re-admit H&P    CHIEF COMPLAINT ON ADMISSION  Chief Complaint   Patient presents with    Abdominal Pain     \" Like an innertube around my mid section \"  Acute onset 1100  Hx  GB pancreatitis and feels similar  Associated with N/V x 1 Chills and diaph No documented fever       Reason for Admission  Abdominal Pain; Chills     Admission Date  3/22/2025    CODE STATUS  Full Code    HPI & HOSPITAL COURSE  This is a 55 y.o. female with a History of gallstone pancreatitis in the past status post cholecystectomy with a history of post-cholecystectomy gallstone pancreatitis who presented with abdominal pain found to have gallstone pancreatitis.  She was admitted for supportive care and MRCP with possible ERCP.  MRCP was positive for CBD stone and mild dilation.  The case was discussed with GI, however no ERCP was available at [facility].  Her LFTs were improving and bilirubin normal, so decision was to continue supportive care with IVF and trend the LFTs until ERCP could be performed.    She developed fever to 101, consistent with ascending cholangitis.  Blood cultures were drawn and she was started on Unasyn.  The case was again discussed with GI and decision was made at that time to transfer to [facility] so urgent ERCP could be performed, now with presentation consistent with ascending cholangitis.      Therefore, she is discharged in guarded and stable condition to a critical access hospital.    She stayed less than 2MNs due to transfer to another facility    FOLLOW UP ITEMS POST DISCHARGE  ERCP at [facility] - case discussed with Dr. Martínez    DISCHARGE DIAGNOSES  Principal Problem:    Acute gallstone pancreatitis (POA: Yes)  Active Problems:    Leukocytosis (POA: Unknown)    Elevated LFTs (POA: Unknown)  Resolved Problems:    * No resolved hospital problems. *      FOLLOW UP  Future Appointments   Date Time Provider Department Center   4/15/2025  1:30 PM CARROL WILHELM [facility]   1/29/2026 "  8:40 AM Ángela Kilgore M.D. [facility]     No follow-up provider specified.    MEDICATIONS ON DISCHARGE     Medication List        ASK your doctor about these medications        Instructions   CALCIUM PO   Take  by mouth.     econazole nitrate 1 % cream   Apply to affected toenails and surrounding skin     estradiol 0.1 MG/GM vaginal cream  Commonly known as: Estrace   Doctor's comments: Please cancel prescriptions from previous doctor  Insert 1 g intravaginally twice weekly.     fluconazole 200 MG Tabs  Commonly known as: Diflucan   Take 1 Tablet by mouth every 7 days.  Dose: 200 mg     MULTI-VITAMIN DAILY PO   Take  by mouth.     VITAMIN C PO   Take  by mouth.              Allergies  Allergies   Allergen Reactions    Metrogel [Metronidazole]        DIET  Orders Placed This Encounter   Procedures    Diet NPO Restrict to: Strict     Standing Status:   Standing     Number of Occurrences:   1     Diet NPO Restrict to::   Strict [1]       ACTIVITY  As tolerated.  Weight bearing as tolerated    CONSULTATIONS  Dr. Mauricio MAHER (regional)  Dr. Douglas MAHER ([facility])    PROCEDURES  None - anticipate ERCP    LABORATORY  Lab Results   Component Value Date    SODIUM 141 03/22/2025    POTASSIUM 3.6 03/22/2025    CHLORIDE 106 03/22/2025    CO2 23 03/22/2025    GLUCOSE 112 (H) 03/22/2025    BUN 11 03/22/2025    CREATININE 0.74 03/22/2025        Lab Results   Component Value Date    WBC 12.3 (H) 03/22/2025    HEMOGLOBIN 13.3 03/22/2025    HEMATOCRIT 40.4 03/22/2025    PLATELETCT 205 03/22/2025        Total time of the discharge process exceeds 41 minutes.

## 2025-03-23 NOTE — PROGRESS NOTES
Pt returned from ERCP. Placed back on monitor showing SB rate of 47. She is A/O x4 started on a clear liquid diet and tolerating liquids without an issue. She denies pain.       9864 Monitor room called to alert nurse that pt HR is dipping down to 39. Pt alert and asymptomatic BP stable and denies pain. MD notified of bradycardia.

## 2025-03-23 NOTE — ANESTHESIA TIME REPORT
Anesthesia Start and Stop Event Times       Date Time Event    3/23/2025 0920 Ready for Procedure     0936 Anesthesia Start     1012 Anesthesia Stop          Responsible Staff  03/23/25      Name Role Begin End    Lokesh Alfaro MD, PhD Anesth 0936 1012          Overtime Reason:  no overtime (within assigned shift)    Comments:

## 2025-03-23 NOTE — OR NURSING
1009 Pt arrived to PACU with Anesthesiologist and OR RN. AAOx4. Even, unlabored respirations. VSS. No pain or nausea.     1033 Report called to BRANNON Rivero on T7.     1049 Pt transported to T737 with RN. Chart and monitor with patient.

## 2025-03-23 NOTE — PROGRESS NOTES
Hospital Medicine Daily Progress Note    Date of Service  3/23/2025    Chief Complaint  Elva Asher is a 55 y.o. female admitted 3/22/2025 with ascending cholangitis    Hospital Course  No notes on file    Interval Problem Update  Patient was seen and examined at bedside.  No acute events overnight. Patient is resting comfortably in bed and in no acute distress.     S/p ERCP   WBC 13.9 --> 5.5  IV unasyn    I have discussed this patient's plan of care and discharge plan at IDT rounds today with Case Management, Nursing, Nursing leadership, and other members of the IDT team.    Consultants/Specialty  GI    Code Status  Full Code    Disposition  The patient is not medically cleared for discharge to home or a post-acute facility.      I have placed the appropriate orders for post-discharge needs.    Review of Systems  Review of Systems   Constitutional:  Negative for chills and fever.   HENT:  Negative for congestion and sore throat.    Eyes:  Negative for blurred vision and double vision.   Respiratory:  Negative for cough and shortness of breath.    Cardiovascular:  Negative for chest pain and palpitations.   Gastrointestinal:  Positive for abdominal pain. Negative for nausea and vomiting.   Genitourinary:  Negative for dysuria and frequency.   Musculoskeletal:  Negative for back pain.   Skin:  Negative for rash.   Neurological:  Negative for seizures and headaches.        Physical Exam  Temp:  [36.5 °C (97.7 °F)-38.6 °C (101.4 °F)] 36.8 °C (98.2 °F)  Pulse:  [47-78] 47  Resp:  [12-18] 14  BP: ()/(42-70) 128/70  SpO2:  [90 %-100 %] 93 %    Physical Exam  Vitals and nursing note reviewed.   Constitutional:       General: She is not in acute distress.  HENT:      Head: Normocephalic.      Right Ear: External ear normal.      Left Ear: External ear normal.      Nose: No congestion.      Mouth/Throat:      Pharynx: No oropharyngeal exudate.   Eyes:      General: No scleral icterus.  Cardiovascular:      Rate  and Rhythm: Normal rate.      Heart sounds: No murmur heard.  Pulmonary:      Breath sounds: No wheezing.   Abdominal:      Tenderness: There is abdominal tenderness. There is no guarding or rebound.   Musculoskeletal:         General: No swelling or deformity.   Skin:     Coloration: Skin is not jaundiced.      Findings: No bruising.   Neurological:      Mental Status: She is alert and oriented to person, place, and time. Mental status is at baseline.      Motor: No weakness.   Psychiatric:         Mood and Affect: Mood normal.         Behavior: Behavior normal.         Fluids    Intake/Output Summary (Last 24 hours) at 3/23/2025 1339  Last data filed at 3/23/2025 1231  Gross per 24 hour   Intake 980 ml   Output --   Net 980 ml        Laboratory  Recent Labs     03/21/25 2036 03/22/25 0205 03/23/25  0225   WBC 13.9* 12.3* 5.5   RBC 4.78 4.29 3.33*   HEMOGLOBIN 14.8 13.3 10.6*   HEMATOCRIT 45.5 40.4 32.4*   MCV 95.2 94.2 97.3   MCH 31.0 31.0 31.8   MCHC 32.5 32.9 32.7   RDW 41.6 41.2 44.2   PLATELETCT 236 205 124*   MPV 11.1 11.3 10.7     Recent Labs     03/21/25 2036 03/22/25 0205 03/23/25  0123   SODIUM 141 141 139   POTASSIUM 3.9 3.6 3.3*   CHLORIDE 103 106 108   CO2 23 23 18*   GLUCOSE 118* 112* 76   BUN 12 11 13   CREATININE 0.86 0.74 0.72   CALCIUM 9.6 8.8 7.9*                   Imaging  DX-PORTABLE FLUOROSCOPY < 1 HOUR Reason For Exam: Main OR    (Results Pending)   [id number] VIEW    (Results Pending)        Assessment/Plan  * Ascending cholangitis- (present on admission)  Assessment & Plan  IV unasyn  S/p ERCP - Choledocholithiasis extracted after biliary sphincterotomy      Hypokalemia  Assessment & Plan  Monitor and replace    Leukocytosis- (present on admission)  Assessment & Plan  WBC 13.9 --> 5.5    Acute gallstone pancreatitis- (present on admission)  Assessment & Plan  IV unasyn  S/p ERCP - Choledocholithiasis extracted after biliary sphincterotomy  GI recs         VTE prophylaxis: VTE  Selection    I have performed a physical exam and reviewed and updated ROS and Plan today (3/23/2025). In review of yesterday's note (3/22/2025), there are no changes except as documented above.    Greater than 51 minutes spent prepping to see patient (e.g. review of tests) obtaining and/or reviewing separately obtained history. Performing a medically appropriate examination and/ evaluation.  Counseling and educating the patient/family/caregiver.  Ordering medications, tests, or procedures.  Referring and communicating with other health care professionals.  Documenting clinical information in EPIC.  Independently interpreting results and communicating results to patient/family/caregiver.  Care coordination.

## 2025-03-23 NOTE — DISCHARGE PLANNING
Received a message from [facility].  Pt to transfer to [facility].   RM [id number]  PCS completed and faxed to [facility].

## 2025-03-23 NOTE — CONSULTS
Date of Consultation:  3/23/2025    Patient: : Elva Asher  MRN: [id number]    Referring Physician:  Dr Fletcher     GI:Wiliam Lundberg M.D.     Reason for Consultation: Ascending cholangitis, acute pancreatitis    History of Present Illness:   55-year-old female status postcholecystectomy presents with recurrent gallstone pancreatitis.  Patient states this has happened once prior.  She has never had ERCP.  She is having some abdominal pain which is improving.  She developed a fever to 101.5 at [facility].  She was transferred here after being started on Unasyn.  Patient had a fever to 100.8 overnight.  She is having no rigors or chills.  She otherwise feels reasonably well.  Did an MRCP showing a small stone in the distal CBD.      Past Medical History:   Diagnosis Date    Pancreatitis due to biliary obstruction        Past Surgical History:   Procedure Laterality Date    ABDOMINAL HYSTERECTOMY TOTAL      CHOLECYSTECTOMY      OOPHORECTOMY Right        Family History   Problem Relation Age of Onset    Hyperlipidemia Mother     Cancer Mother         melanoma    Diabetes Father     Other Father         hepatitis c    Heart Disease Maternal Grandmother     Hypertension Maternal Grandmother     Cancer Other         great uncle had melanoma    Stroke Neg Hx     Ovarian Cancer Neg Hx     Tubal Cancer Neg Hx     Peritoneal Cancer Neg Hx     Colorectal Cancer Neg Hx     Breast Cancer Neg Hx        Social History     Socioeconomic History    Marital status:     Number of children: 2    Highest education level: Bachelor's degree (e.g., BA, AB, BS)   Occupational History     Employer: NOT EMPLOYED   Tobacco Use    Smoking status: Never    Smokeless tobacco: Never   Vaping Use    Vaping status: Former    Substances: THC   Substance and Sexual Activity    Alcohol use: Yes     Comment: Socially    Drug use: Yes     Frequency: 2.0 times per week     Types: Marijuana, Inhaled     Comment: Help sleep and stop night sweats     Sexual activity: Yes     Partners: Male     Birth control/protection: Post-Menopausal     Comment:   [place]   Social History Narrative    Lives with .     Social Drivers of Health     Financial Resource Strain: Low Risk  (1/29/2025)    Overall Financial Resource Strain (CARDIA)     Difficulty of Paying Living Expenses: Not very hard   Food Insecurity: No Food Insecurity (3/22/2025)    Hunger Vital Sign     Worried About Running Out of Food in the Last Year: Never true     Ran Out of Food in the Last Year: Never true   Transportation Needs: No Transportation Needs (3/22/2025)    PRAPARE - Transportation     Lack of Transportation (Medical): No     Lack of Transportation (Non-Medical): No   Physical Activity: Insufficiently Active (1/29/2025)    Exercise Vital Sign     Days of Exercise per Week: 3 days     Minutes of Exercise per Session: 40 min   Stress: No Stress Concern Present (1/29/2025)    [employer] - Occupational Stress Questionnaire     Feeling of Stress : Only a little   Social Connections: Unknown (1/29/2025)    Social Connection and Isolation Panel [NHANES]     Frequency of Communication with Friends and Family: Twice a week     Frequency of Social Gatherings with Friends and Family: Patient declined     Attends [employer] Services: Never     Active Member of Clubs or Organizations: No     Attends Club or Organization Meetings: Never     Marital Status:    Intimate Partner Violence: Not At Risk (3/22/2025)    Humiliation, Afraid, Rape, and Kick questionnaire     Fear of Current or Ex-Partner: No     Emotionally Abused: No     Physically Abused: No     Sexually Abused: No   Housing Stability: Low Risk  (3/22/2025)    Housing Stability Vital Sign     Unable to Pay for Housing in the Last Year: No     Number of Times Moved in the Last Year: 0     Homeless in the Last Year: No       Current Meds (name, sig, last dose):     Current  Facility-Administered Medications:     ketorolac    Indomethacin    senna-docusate **AND** polyethylene glycol/lytes    ondansetron    ondansetron    promethazine    promethazine    prochlorperazine    HYDROmorphone    HYDROmorphone    NS    oxyCODONE immediate-release    ampicillin-sulbactam (UNASYN) IV      ROS  10 systems reviewed and are negative unless otherwise noted in history of present illness.    Physical Exam:  Vitals:    03/22/25 2358 03/23/25 0005 03/23/25 0252 03/23/25 0500   BP: 100/47   94/48   Pulse: 69   60   Resp: 16   16   Temp: 37.2 °C (99 °F) (!) 38.2 °C (100.8 °F) 36.6 °C (97.9 °F) 36.8 °C (98.2 °F)   TempSrc: Temporal Oral Temporal Temporal   SpO2: 90%   93%   Weight:    90 kg (198 lb 6.6 oz)   Height:           Physical Exam  Constitutional:       Appearance: Normal appearance.   Eyes:      General: No scleral icterus.  Cardiovascular:      Rate and Rhythm: Normal rate and regular rhythm.      Heart sounds: Normal heart sounds.   Pulmonary:      Breath sounds: Normal breath sounds.   Abdominal:      General: Abdomen is flat. There is no distension.      Palpations: Abdomen is soft.      Tenderness: There is no abdominal tenderness.   Skin:     General: Skin is warm and dry.   Neurological:      Mental Status: She is alert and oriented to person, place, and time.   Psychiatric:         Judgment: Judgment normal.           Labs:  Recent Labs     03/21/25 2036 03/22/25 0205 03/23/25 0123   SODIUM 141 141 139   POTASSIUM 3.9 3.6 3.3*   CHLORIDE 103 106 108   CO2 23 23 18*   BUN 12 11 13   CREATININE 0.86 0.74 0.72   MAGNESIUM  --   --  1.9   CALCIUM 9.6 8.8 7.9*     Recent Labs     03/21/25 2036 03/22/25 0205 03/23/25  0123   ALTSGPT 677* 535* 288*   ASTSGOT 926* 516* 130*   ALKPHOSPHAT 113* 94 78   TBILIRUBIN 1.4 0.8 0.8   LIPASE 2481*  --   --    GLUCOSE 118* 112* 76     Recent Labs     03/21/25 2036 03/22/25 0205 03/23/25  0123 03/23/25  0225   WBC 13.9* 12.3*  --  5.5   NEUTSPOLYS  86.40*  --   --   --    LYMPHOCYTES 5.10*  --   --   --    MONOCYTES 7.40  --   --   --    EOSINOPHILS 0.20  --   --   --    BASOPHILS 0.50  --   --   --    ASTSGOT 926* 516* 130*  --    ALTSGPT 677* 535* 288*  --    ALKPHOSPHAT 113* 94 78  --    TBILIRUBIN 1.4 0.8 0.8  --      Recent Labs     03/21/25 2036 03/22/25  0205 03/23/25  0225   RBC 4.78 4.29 3.33*   HEMOGLOBIN 14.8 13.3 10.6*   HEMATOCRIT 45.5 40.4 32.4*   PLATELETCT 236 205 124*     Recent Results (from the past 24 hours)   BLOOD CULTURE    Collection Time: 03/22/25  5:56 PM    Specimen: Peripheral; Blood   Result Value Ref Range    Significant Indicator NEG     Source BLD     Site PERIPHERAL     Culture Result       No Growth  Note: Blood cultures are incubated for 5 days and  are monitored continuously.Positive blood cultures  are called to the RN and reported as soon as  they are identified.  Blood culture testing and Gram stain, if indicated, are  performed at [facility],  [address] [place] [address].  Positive blood  cultures are sent to [facility],  [address] [place] [address], for organism identification  and susceptibility testing.     BLOOD CULTURE    Collection Time: 03/22/25  5:56 PM    Specimen: Peripheral; Blood   Result Value Ref Range    Significant Indicator NEG     Source BLD     Site PERIPHERAL     Culture Result       No Growth  Note: Blood cultures are incubated for 5 days and  are monitored continuously.Positive blood cultures  are called to the RN and reported as soon as  they are identified.  Blood culture testing and Gram stain, if indicated, are  performed at [facility],  [address] [place] [address].  Positive blood  cultures are sent to [facility],  [address] [place] [address], for organism identification  and susceptibility testing.     LACTIC ACID    Collection Time: 03/22/25  5:56 PM   Result Value Ref Range     Lactic Acid 0.8 0.5 - 2.0 mmol/L   Comp Metabolic Panel    Collection Time: 03/23/25  1:23 AM   Result Value Ref Range    Sodium 139 135 - 145 mmol/L    Potassium 3.3 (L) 3.6 - 5.5 mmol/L    Chloride 108 96 - 112 mmol/L    Co2 18 (L) 20 - 33 mmol/L    Anion Gap 13.0 7.0 - 16.0    Glucose 76 65 - 99 mg/dL    Bun 13 8 - 22 mg/dL    Creatinine 0.72 0.50 - 1.40 mg/dL    Calcium 7.9 (L) 8.5 - 10.5 mg/dL    Correct Calcium 8.6 8.5 - 10.5 mg/dL    AST(SGOT) 130 (H) 12 - 45 U/L    ALT(SGPT) 288 (H) 2 - 50 U/L    Alkaline Phosphatase 78 30 - 99 U/L    Total Bilirubin 0.8 0.1 - 1.5 mg/dL    Albumin 3.1 (L) 3.2 - 4.9 g/dL    Total Protein 5.2 (L) 6.0 - 8.2 g/dL    Globulin 2.1 1.9 - 3.5 g/dL    A-G Ratio 1.5 g/dL   MAGNESIUM    Collection Time: 03/23/25  1:23 AM   Result Value Ref Range    Magnesium 1.9 1.5 - 2.5 mg/dL   ESTIMATED GFR    Collection Time: 03/23/25  1:23 AM   Result Value Ref Range    GFR (CKD-EPI) 100 >60 mL/min/1.73 m 2   CBC WITHOUT DIFFERENTIAL    Collection Time: 03/23/25  2:25 AM   Result Value Ref Range    WBC 5.5 4.8 - 10.8 K/uL    RBC 3.33 (L) 4.20 - 5.40 M/uL    Hemoglobin 10.6 (L) 12.0 - 16.0 g/dL    Hematocrit 32.4 (L) 37.0 - 47.0 %    MCV 97.3 81.4 - 97.8 fL    MCH 31.8 27.0 - 33.0 pg    MCHC 32.7 32.2 - 35.5 g/dL    RDW 44.2 35.9 - 50.0 fL    Platelet Count 124 (L) 164 - 446 K/uL    MPV 10.7 9.0 - 12.9 fL   HOLD BLOOD BANK SPECIMEN (NOT TESTED)    Collection Time: 03/23/25  2:25 AM   Result Value Ref Range    Holding Tube - Bb DONE          Imaging:  [id number]  Narrative: 3/22/2025 10:38 AM    HISTORY/REASON FOR EXAM:  ABDOMINAL PAIN  Hx ?GB pancreatitis and feels similar  Associated with N/V x 1 Chills    TECHNIQUE/EXAM DESCRIPTION: Magnetic resonance cholangiopancreatography.    Magnetic resonance cholangiopancreatography was performed on with axial, coronal, and sagittal thin and thick section heavily T2-weighted sequences.    3-D cholangiographic multiplanar maximum intensity projection  (MIP) images were created on a workstation..    The study was performed on a [employer] 1.5 Valeria MRI scanner.    COMPARISON: CT 3/21/2025.    FINDINGS:    Limited evaluation due to lack of IV contrast.    Bile Ducts: There is a 4 mm in the distal CBD. The CBD measuring up to 8 mm.    Gallbladder: Surgically absent.    Within the limits of noncontrast technique:    Liver: The liver is normal in size and configuration. No focal hepatic lesion is seen.    Pancreas and Pancreatic Duct: Mild prominence of the pancreatic head. No pancreatic duct dilation is seen.    Kidneys: Normal.    Adrenal Glands: Normal.    Spleen: Normal in size without focal lesion.    Ascites: Not present.    Lymph Nodes: No abdominal lymphadenopathy is seen.    Visualized Bowel: Grossly normal.    Bones: No suspicious osseous lesions are noted.  Impression: 1. A stone in the distal CBD with mildly dilated CBD.  CT-ABDOMEN-PELVIS WITH  Narrative: 3/21/2025 11:51 PM    HISTORY/REASON FOR EXAM:  Pancreatitis And Transaminitis, Epigastric Pain.    TECHNIQUE/EXAM DESCRIPTION:   CT scan of the abdomen and pelvis with contrast.    Contrast-enhanced helical scanning was obtained from the diaphragmatic domes through the pubic symphysis following the bolus administration of nonionic contrast without complication.    100 mL of Omnipaque 350 nonionic contrast was administered without complication.    Low dose optimization technique was utilized for this CT exam including automated exposure control and adjustment of the mA and/or kV according to patient size.    COMPARISON: No prior studies available.    FINDINGS:  Lower Chest: Unremarkable.    Liver: Decreased attenuation.    Spleen: Mildly enlarged.    Pancreas: Head and body is slightly enlarged.    Gallbladder: Surgically absent    Biliary: Nondilated.    Adrenal glands: Normal.    Kidneys: Unremarkable without hydronephrosis.    Bowel: No obstruction or acute inflammation.    Lymph nodes: No  adenopathy.    Vasculature: Unremarkable.    Peritoneum: Unremarkable without ascites.    Musculoskeletal: No acute or destructive process.    Pelvis: No adenopathy or free fluid.  Impression: 1.  Mild acute pancreatitis involving the pancreatic head and body.    2.  Hepatic steatosis and mild splenomegaly.        MDM Data Review:  -Records reviewed and summarized in current documentation  -I personally reviewed and interpreted the laboratory results  -I personally reviewed the radiology images  -I have personally reviewed medications    Hospital Problem List:  Active Hospital Problems    Diagnosis     Ascending cholangitis [K83.09]     Acute gallstone pancreatitis [K85.10]     Elevated LFTs [R79.89]     Leukocytosis [D72.829]        Impressions:  55-year-old female with gallstone pancreatitis and fever with leukocytosis.  Patient is being treated as presumed ascending cholangitis.  Patient transferred from [facility] ER for ERCP.  She has MRCP demonstrating small distal stone in the CBD.    Risk-benefit of ERCP reviewed.  Patient willing to proceed.      Recommendations:  Continue n.p.o. status  Preoperative indomethacin  ERCP this a.m.

## 2025-03-23 NOTE — PROGRESS NOTES
Bedside reported received from night shift. Pt on tele running SB at 47. A/O x4 denies pain at time of report. Informed ERCP and abd x-ray would be later this morning.  Call light within reach and all needs met.

## 2025-03-23 NOTE — DISCHARGE SUMMARY
"Discharge Summary    CHIEF COMPLAINT ON ADMISSION  Chief Complaint   Patient presents with    Abdominal Pain     \" Like an innertube around my mid section \"  Acute onset 1100  Hx  GB pancreatitis and feels similar  Associated with N/V x 1 Chills and diaph No documented fever       Reason for Admission  Abdominal Pain; Chills     Admission Date  3/21/2025    CODE STATUS  Full Code    HPI & HOSPITAL COURSE  This is a 55 y.o. female with a History of gallstone pancreatitis in the past status post cholecystectomy with a history of post-cholecystectomy gallstone pancreatitis who presented with abdominal pain found to have gallstone pancreatitis.  She was admitted for supportive care and MRCP with possible ERCP.  MRCP was positive for CBD stone and mild dilation.  The case was discussed with GI, however no ERCP was available at [facility].  Her LFTs were improving and bilirubin normal, so decision was to continue supportive care with IVF and trend the LFTs until ERCP could be performed.    She developed fever to 101, consistent with ascending cholangitis.  Blood cultures were drawn and she was started on Unasyn.  The case was again discussed with GI and decision was made at that time to transfer to [facility] so urgent ERCP could be performed, now with presentation consistent with ascending cholangitis.      Therefore, she is discharged in guarded and stable condition to a critical access hospital.    She stayed less than 2MNs due to transfer to another facility    Discharge Date  3/22/2025    FOLLOW UP ITEMS POST DISCHARGE  ERCP at [facility] - case discussed with Dr. Martínez    DISCHARGE DIAGNOSES  Principal Problem:    Acute gallstone pancreatitis (POA: Yes)  Active Problems:    Leukocytosis (POA: Unknown)    Elevated LFTs (POA: Unknown)    Ascending cholangitis (POA: Yes)  Resolved Problems:    * No resolved hospital problems. *      FOLLOW UP  Future Appointments   Date Time Provider Department Center   4/15/2025  1:30 " PM S [facility] 1 [facility]   1/29/2026  8:40 AM [facility]     No follow-up provider specified.    MEDICATIONS ON DISCHARGE     Medication List        ASK your doctor about these medications        Instructions   CALCIUM PO   Take  by mouth.     econazole nitrate 1 % cream   Apply to affected toenails and surrounding skin     estradiol 0.1 MG/GM vaginal cream  Commonly known as: Estrace   Doctor's comments: Please cancel prescriptions from previous doctor  Insert 1 g intravaginally twice weekly.     fluconazole 200 MG Tabs  Commonly known as: Diflucan   Take 1 Tablet by mouth every 7 days.  Dose: 200 mg     MULTI-VITAMIN DAILY PO   Take  by mouth.     VITAMIN C PO   Take  by mouth.              Allergies  Allergies   Allergen Reactions    Metrogel [Metronidazole]        DIET  Orders Placed This Encounter   Procedures    Diet NPO Restrict to: Strict     Standing Status:   Standing     Number of Occurrences:   1     Diet NPO Restrict to::   Strict [1]       ACTIVITY  As tolerated.  Weight bearing as tolerated    CONSULTATIONS  Dr. Mauricio MAHER (regional)  Dr. Douglas MAHER ([facility])    PROCEDURES  None - anticipate ERCP    LABORATORY  Lab Results   Component Value Date    SODIUM 141 03/22/2025    POTASSIUM 3.6 03/22/2025    CHLORIDE 106 03/22/2025    CO2 23 03/22/2025    GLUCOSE 112 (H) 03/22/2025    BUN 11 03/22/2025    CREATININE 0.74 03/22/2025        Lab Results   Component Value Date    WBC 12.3 (H) 03/22/2025    HEMOGLOBIN 13.3 03/22/2025    HEMATOCRIT 40.4 03/22/2025    PLATELETCT 205 03/22/2025        Total time of the discharge process exceeds 41 minutes.

## 2025-03-23 NOTE — ANESTHESIA PROCEDURE NOTES
Airway    Date/Time: 3/23/2025 9:44 AM    Performed by: Lokesh Alfaro MD, PhD  Authorized by: Lokesh Alfaro MD, PhD    Location:  OR  Urgency:  Elective  Indications for Airway Management:  Anesthesia      Spontaneous Ventilation: absent    Sedation Level:  Deep  Preoxygenated: Yes    Patient Position:  Sniffing  Mask Difficulty Assessment:  1 - vent by mask  Final Airway Type:  Endotracheal airway  Final Endotracheal Airway:  ETT  Cuffed: Yes    Technique Used for Successful ETT Placement:  Direct laryngoscopy  Devices/Methods Used in Placement:  Anterior pressure/BURP    Insertion Site:  Oral  Blade Type:  Kat  Laryngoscope Blade/Videolaryngoscope Blade Size:  3  ETT Size (mm):  7.0  Measured from:  Teeth  ETT to Teeth (cm):  22  Placement Verified by: auscultation and capnometry    Cormack-Lehane Classification:  Grade IIa - partial view of glottis  Number of Attempts at Approach:  1

## 2025-03-23 NOTE — PROGRESS NOTES
Patient is a 55-year-old female admitted at [facility] 3/21/2025 for abdominal pain, thought to have acute cholangitis and gallstone pancreatitis.  Patient was transferred from [facility] to [facility] as no ERCP capability available for the weekend.  Please see H&P and DC summary by Dr. Fletcher for details.    Notified GI (Dr. Lundberg)  -NPO after MN for ERCP 9am

## 2025-03-23 NOTE — PROGRESS NOTES
4 Eyes Skin Assessment Completed by BRANNON Gil and BRANNON Conn.    Head WDL  Ears Redness (r) scab  Nose WDL  Mouth WDL  Neck WDL  Breast/Chest WDL  Shoulder Blades WDL  Spine WDL  (R) Arm/Elbow/Hand WDL  (L) Arm/Elbow/Hand WDL  Abdomen WDL  Groin WDL  Scrotum/Coccyx/Buttocks WDL  (R) Leg WDL  (L) Leg WDL  (R) Heel/Foot/Toe WDL  (L) Heel/Foot/Toe WDL          Devices In Places Tele Box, Blood Pressure Cuff, and Pulse Ox      Interventions In Place Pillows    Possible Skin Injury No    Pictures Uploaded Into Epic Yes  Wound Consult Placed N/A  RN Wound Prevention Protocol Ordered Yes

## 2025-03-23 NOTE — OP REPORT
PreOp Diagnosis: Gallstone pancreatitis, ascending cholangitis, choledocholithiasis      PostOp Diagnosis: Choledocholithiasis      Procedure(s):  ERCP (ENDOSCOPIC RETROGRADE CHOLANGIOPANCREATOGRAPHY) - Wound Class: Clean  SPHINCTEROTOMY - Wound Class: Clean  ERCP, WITH CALCULUS REMOVAL - Wound Class: Clean    Surgeon(s):  Wiliam Lundberg M.D.    Anesthesiologist/Type of Anesthesia:  Anesthesiologist: Lokesh Alfaro MD, PhD/General    Surgical Staff:  Endoscopy Technician: Liza S Reyes  Radiology Technologist: July Yi  Endoscopy Nurse: Tracie Bolanos R.N.    Specimens removed if any:  * No specimens in log *      CONSENT: The risks, benefits and alternatives of the procedure were discussed in detail. The risks include and are not limited to bleeding, infection, perforation, missed lesions, and sedations risks (cardiopulmonary compromise and allergic reaction to medications).    DESCRIPTION:   The patient presented to the operating room.  A time out was performed prior to beginning the procedure.   The patient was placed in the supine position.   Patient was sedated by anesthesia: GETA.  Indomethacin 100 mg per rectum administered preoperatively.  Patient on Unasyn preoperatively.    OPERATIVE FINDINGS:    Endoscope advanced under oblique visualization second portion of the duodenum.  Juxtapapillary diverticulum present.  CBD cannulated.  Cholangiogram demonstrated postcholecystectomy anatomy.  Distal CBD stone appreciated.  8 mm CBD diameter.  1 cm biliary sphincterotomy pursued.  Balloon sweep from the bifurcation revealed 1 stone.  No stones remained.    Blood loss: None    The patient tolerated the procedure well.      There were no immediate complications.    IMPRESSION:  Choledocholithiasis extracted after biliary sphincterotomy  Juxtapapillary diverticulum      RECOMMENDATIONS:  Watch for complications.  Clear liquid diet.  No convincing evidence on cholangiogram and balloon sweep of cholangitis.   Timing of discontinuation of Unasyn defer to hospitalist.  Anticipate no further GI interventions.  Return patient to hospital [facility] for continued care.

## 2025-03-23 NOTE — ANESTHESIA POSTPROCEDURE EVALUATION
Patient: Elva Asher    Procedure Summary       Date: 03/23/25 Room / Location: [facility] OR 06 / SURGERY [facility]    Anesthesia Start: 0936 Anesthesia Stop: 1012    Procedures:       ERCP (ENDOSCOPIC RETROGRADE CHOLANGIOPANCREATOGRAPHY) (Esophagus)      SPHINCTEROTOMY (Esophagus)      ERCP, WITH CALCULUS REMOVAL (Esophagus) Diagnosis: (CHOLEDOCHOLITHIASIS, BILAIRY SPHINCTEROTOMY)    Surgeons: Wiliam Lundberg M.D. Responsible Provider: Lokesh Alfaro MD, PhD    Anesthesia Type: general ASA Status: 2            Final Anesthesia Type: general  Last vitals  BP   Blood Pressure: 125/60    Temp   36.8 °C (98.2 °F)    Pulse   (!) 55   Resp   13    SpO2   100 %      Anesthesia Post Evaluation    Patient location during evaluation: PACU  Patient participation: complete - patient participated  Level of consciousness: awake  Pain score: 2    Airway patency: patent  Anesthetic complications: no  Cardiovascular status: hemodynamically stable  Respiratory status: acceptable  Hydration status: acceptable    PONV: none          No notable events documented.     Nurse Pain Score: 0 (NPRS)

## 2025-03-23 NOTE — PROGRESS NOTES
Received report from day shift nurse. Assumed pt care at 1915. Pt is A&Ox4, resting comfortably in bed. Pt on r.a.. No signs of SOB/respiratory distress. Labs noted, VSS. Pt c/o no pain at this moment. Fall precautions in place. Bed at lowest position. Call light and personal belongings within reach. Plan of care on going, no further concerns as of present. Pt for transfer anytime tonight in [facility].

## 2025-03-24 ENCOUNTER — APPOINTMENT (OUTPATIENT)
Dept: CARDIOLOGY | Facility: MEDICAL CENTER | Age: 55
DRG: 444 | End: 2025-03-24
Attending: INTERNAL MEDICINE
Payer: OTHER GOVERNMENT

## 2025-03-24 PROBLEM — R00.1 BRADYCARDIA: Status: ACTIVE | Noted: 2025-03-24

## 2025-03-24 PROBLEM — E27.40 ADRENAL INSUFFICIENCY (HCC): Status: ACTIVE | Noted: 2025-03-24

## 2025-03-24 LAB
ALBUMIN SERPL BCP-MCNC: 3.4 G/DL (ref 3.2–4.9)
ALBUMIN/GLOB SERPL: 1.4 G/DL
ALP SERPL-CCNC: 94 U/L (ref 30–99)
ALT SERPL-CCNC: 233 U/L (ref 2–50)
ANION GAP SERPL CALC-SCNC: 9 MMOL/L (ref 7–16)
AST SERPL-CCNC: 90 U/L (ref 12–45)
BASOPHILS # BLD AUTO: 0.4 % (ref 0–1.8)
BASOPHILS # BLD: 0.02 K/UL (ref 0–0.12)
BILIRUB SERPL-MCNC: 0.5 MG/DL (ref 0.1–1.5)
BUN SERPL-MCNC: 7 MG/DL (ref 8–22)
CALCIUM ALBUM COR SERPL-MCNC: 8.7 MG/DL (ref 8.5–10.5)
CALCIUM SERPL-MCNC: 8.2 MG/DL (ref 8.5–10.5)
CHLORIDE SERPL-SCNC: 110 MMOL/L (ref 96–112)
CO2 SERPL-SCNC: 22 MMOL/L (ref 20–33)
CORTIS SERPL-MCNC: 1.8 UG/DL (ref 0–23)
CREAT SERPL-MCNC: 0.66 MG/DL (ref 0.5–1.4)
EKG IMPRESSION: NORMAL
EOSINOPHIL # BLD AUTO: 0.02 K/UL (ref 0–0.51)
EOSINOPHIL NFR BLD: 0.4 % (ref 0–6.9)
ERYTHROCYTE [DISTWIDTH] IN BLOOD BY AUTOMATED COUNT: 41.4 FL (ref 35.9–50)
GFR SERPLBLD CREATININE-BSD FMLA CKD-EPI: 105 ML/MIN/1.73 M 2
GLOBULIN SER CALC-MCNC: 2.4 G/DL (ref 1.9–3.5)
GLUCOSE SERPL-MCNC: 124 MG/DL (ref 65–99)
HCT VFR BLD AUTO: 34.2 % (ref 37–47)
HGB BLD-MCNC: 11.4 G/DL (ref 12–16)
IMM GRANULOCYTES # BLD AUTO: 0.02 K/UL (ref 0–0.11)
IMM GRANULOCYTES NFR BLD AUTO: 0.4 % (ref 0–0.9)
LV EJECT FRACT  99904: 55
LV EJECT FRACT MOD 2C 99903: 60.14
LV EJECT FRACT MOD 4C 99902: 61.84
LV EJECT FRACT MOD BP 99901: 59.67
LYMPHOCYTES # BLD AUTO: 0.99 K/UL (ref 1–4.8)
LYMPHOCYTES NFR BLD: 19.2 % (ref 22–41)
MAGNESIUM SERPL-MCNC: 2.1 MG/DL (ref 1.5–2.5)
MCH RBC QN AUTO: 31.2 PG (ref 27–33)
MCHC RBC AUTO-ENTMCNC: 33.3 G/DL (ref 32.2–35.5)
MCV RBC AUTO: 93.7 FL (ref 81.4–97.8)
MONOCYTES # BLD AUTO: 0.58 K/UL (ref 0–0.85)
MONOCYTES NFR BLD AUTO: 11.3 % (ref 0–13.4)
NEUTROPHILS # BLD AUTO: 3.52 K/UL (ref 1.82–7.42)
NEUTROPHILS NFR BLD: 68.3 % (ref 44–72)
NRBC # BLD AUTO: 0 K/UL
NRBC BLD-RTO: 0 /100 WBC (ref 0–0.2)
PHOSPHATE SERPL-MCNC: 1.8 MG/DL (ref 2.5–4.5)
PLATELET # BLD AUTO: 139 K/UL (ref 164–446)
PMV BLD AUTO: 10.9 FL (ref 9–12.9)
POTASSIUM SERPL-SCNC: 4.1 MMOL/L (ref 3.6–5.5)
PROT SERPL-MCNC: 5.8 G/DL (ref 6–8.2)
RBC # BLD AUTO: 3.65 M/UL (ref 4.2–5.4)
SODIUM SERPL-SCNC: 141 MMOL/L (ref 135–145)
TSH SERPL DL<=0.005 MIU/L-ACNC: 0.58 UIU/ML (ref 0.38–5.33)
WBC # BLD AUTO: 5.2 K/UL (ref 4.8–10.8)

## 2025-03-24 PROCEDURE — 700102 HCHG RX REV CODE 250 W/ 637 OVERRIDE(OP): Performed by: INTERNAL MEDICINE

## 2025-03-24 PROCEDURE — 99233 SBSQ HOSP IP/OBS HIGH 50: CPT | Performed by: INTERNAL MEDICINE

## 2025-03-24 PROCEDURE — A9270 NON-COVERED ITEM OR SERVICE: HCPCS | Performed by: INTERNAL MEDICINE

## 2025-03-24 PROCEDURE — 80053 COMPREHEN METABOLIC PANEL: CPT

## 2025-03-24 PROCEDURE — 700111 HCHG RX REV CODE 636 W/ 250 OVERRIDE (IP): Mod: JZ | Performed by: INTERNAL MEDICINE

## 2025-03-24 PROCEDURE — 84443 ASSAY THYROID STIM HORMONE: CPT

## 2025-03-24 PROCEDURE — 93306 TTE W/DOPPLER COMPLETE: CPT | Mod: 26 | Performed by: INTERNAL MEDICINE

## 2025-03-24 PROCEDURE — 93306 TTE W/DOPPLER COMPLETE: CPT

## 2025-03-24 PROCEDURE — 85025 COMPLETE CBC W/AUTO DIFF WBC: CPT

## 2025-03-24 PROCEDURE — 93005 ELECTROCARDIOGRAM TRACING: CPT | Mod: TC | Performed by: INTERNAL MEDICINE

## 2025-03-24 PROCEDURE — 84100 ASSAY OF PHOSPHORUS: CPT

## 2025-03-24 PROCEDURE — 700105 HCHG RX REV CODE 258: Performed by: INTERNAL MEDICINE

## 2025-03-24 PROCEDURE — 770020 HCHG ROOM/CARE - TELE (206)

## 2025-03-24 PROCEDURE — 82533 TOTAL CORTISOL: CPT

## 2025-03-24 PROCEDURE — 83735 ASSAY OF MAGNESIUM: CPT

## 2025-03-24 RX ORDER — HYDROCORTISONE SODIUM SUCCINATE 100 MG/2ML
50 INJECTION INTRAMUSCULAR; INTRAVENOUS EVERY 6 HOURS
Status: DISCONTINUED | OUTPATIENT
Start: 2025-03-24 | End: 2025-03-25 | Stop reason: HOSPADM

## 2025-03-24 RX ADMIN — SODIUM CHLORIDE: 9 INJECTION, SOLUTION INTRAVENOUS at 03:37

## 2025-03-24 RX ADMIN — HYDROCORTISONE SODIUM SUCCINATE 50 MG: 100 INJECTION, POWDER, FOR SOLUTION INTRAMUSCULAR; INTRAVENOUS at 17:30

## 2025-03-24 RX ADMIN — AMPICILLIN AND SULBACTAM 3 G: 1; 2 INJECTION, POWDER, FOR SOLUTION INTRAMUSCULAR; INTRAVENOUS at 05:42

## 2025-03-24 RX ADMIN — AMPICILLIN AND SULBACTAM 3 G: 1; 2 INJECTION, POWDER, FOR SOLUTION INTRAMUSCULAR; INTRAVENOUS at 12:31

## 2025-03-24 RX ADMIN — AMOXICILLIN AND CLAVULANATE POTASSIUM 1 TABLET: 875; 125 TABLET, FILM COATED ORAL at 17:30

## 2025-03-24 RX ADMIN — HYDROCORTISONE SODIUM SUCCINATE 50 MG: 100 INJECTION, POWDER, FOR SOLUTION INTRAMUSCULAR; INTRAVENOUS at 12:25

## 2025-03-24 ASSESSMENT — ENCOUNTER SYMPTOMS
WEAKNESS: 0
SEIZURES: 0
BLOOD IN STOOL: 0
MYALGIAS: 0
NAUSEA: 0
CHILLS: 0
DOUBLE VISION: 0
FLANK PAIN: 0
NERVOUS/ANXIOUS: 0
DIARRHEA: 0
SORE THROAT: 1
BLURRED VISION: 0
ABDOMINAL PAIN: 1
SHORTNESS OF BREATH: 0
INSOMNIA: 0
ABDOMINAL PAIN: 0
VOMITING: 0
SORE THROAT: 0
FEVER: 0
BACK PAIN: 0
HEADACHES: 0
CONSTIPATION: 0
COUGH: 0
PALPITATIONS: 0

## 2025-03-24 ASSESSMENT — FIBROSIS 4 INDEX: FIB4 SCORE: 2.25

## 2025-03-24 ASSESSMENT — PAIN DESCRIPTION - PAIN TYPE: TYPE: ACUTE PAIN

## 2025-03-24 NOTE — PROGRESS NOTES
Monitor summary: Day shift  Rhythm: SB\SR  Rate: 39-84  Measurements: .16/0.10/.43   Ectopy: PAC rare

## 2025-03-24 NOTE — PROGRESS NOTES
Hospital Medicine Daily Progress Note    Date of Service  3/24/2025    Chief Complaint  Elva Asher is a 55 y.o. female admitted 3/22/2025 with ascending cholangitis    Hospital Course  No notes on file    Interval Problem Update  Patient was seen and examined at bedside.  No acute events overnight. Patient is resting comfortably in bed and in no acute distress.     S/p ERCP   WBC 13.9 --> 5.2  Blood culture negative x2 days  Transition unasyn to augmentin  Patient is bradycardic, AM cortisol profoundly low at 1.8    - stress dose steroids ordered    - TSH WNL    - 2D echo unremarkable    I have discussed this patient's plan of care and discharge plan at IDT rounds today with Case Management, Nursing, Nursing leadership, and other members of the IDT team.    Consultants/Specialty  GI    Code Status  Full Code    Disposition  The patient is not medically cleared for discharge to home or a post-acute facility.      I have placed the appropriate orders for post-discharge needs.    Review of Systems  Review of Systems   Constitutional:  Negative for chills and fever.   HENT:  Negative for congestion and sore throat.    Eyes:  Negative for blurred vision and double vision.   Respiratory:  Negative for cough and shortness of breath.    Cardiovascular:  Negative for chest pain and palpitations.   Gastrointestinal:  Positive for abdominal pain. Negative for nausea and vomiting.   Genitourinary:  Negative for dysuria and frequency.   Musculoskeletal:  Negative for back pain.   Skin:  Negative for rash.   Neurological:  Negative for seizures and headaches.        Physical Exam  Temp:  [36.4 °C (97.5 °F)-36.7 °C (98.1 °F)] 36.7 °C (98.1 °F)  Pulse:  [44-59] 53  Resp:  [16-17] 16  BP: (115-131)/(68-72) 127/70  SpO2:  [95 %-100 %] 100 %    Physical Exam  Vitals and nursing note reviewed.   Constitutional:       General: She is not in acute distress.  HENT:      Head: Normocephalic.      Right Ear: External ear normal.       Left Ear: External ear normal.      Nose: No congestion.      Mouth/Throat:      Pharynx: No oropharyngeal exudate.   Eyes:      General: No scleral icterus.  Cardiovascular:      Rate and Rhythm: Normal rate.      Heart sounds: No murmur heard.  Pulmonary:      Breath sounds: No wheezing.   Abdominal:      Tenderness: There is abdominal tenderness. There is no guarding or rebound.   Musculoskeletal:         General: No swelling or deformity.   Skin:     Coloration: Skin is not jaundiced.      Findings: No bruising.   Neurological:      Mental Status: She is alert and oriented to person, place, and time. Mental status is at baseline.      Motor: No weakness.   Psychiatric:         Mood and Affect: Mood normal.         Behavior: Behavior normal.         Fluids  No intake or output data in the 24 hours ending 03/24/25 1544       Laboratory  Recent Labs     03/22/25  0205 03/23/25  0225 03/24/25  0033   WBC 12.3* 5.5 5.2   RBC 4.29 3.33* 3.65*   HEMOGLOBIN 13.3 10.6* 11.4*   HEMATOCRIT 40.4 32.4* 34.2*   MCV 94.2 97.3 93.7   MCH 31.0 31.8 31.2   MCHC 32.9 32.7 33.3   RDW 41.2 44.2 41.4   PLATELETCT 205 124* 139*   MPV 11.3 10.7 10.9     Recent Labs     03/22/25  0205 03/23/25  0123 03/24/25  0033   SODIUM 141 139 141   POTASSIUM 3.6 3.3* 4.1   CHLORIDE 106 108 110   CO2 23 18* 22   GLUCOSE 112* 76 124*   BUN 11 13 7*   CREATININE 0.74 0.72 0.66   CALCIUM 8.8 7.9* 8.2*                   Imaging  EC-ECHOCARDIOGRAM COMPLETE W/O CONT   Final Result      DX-PORTABLE FLUOROSCOPY < 1 HOUR Reason For Exam: Main OR   Final Result      Portable fluoroscopy utilized for 27 seconds.         INTERPRETING LOCATION: [address] [place], [place] [address], [address]      [id number] VIEW   Final Result      Digitized intraoperative radiograph is submitted for review. This examination is not for diagnostic purpose but for guidance during a surgical procedure. Please see the patient's chart for full procedural details.         [facility] [address]           Assessment/Plan  * Adrenal insufficiency (HCC)  Assessment & Plan  In setting of critical illness  Patient is bradycardic, AM cortisol profoundly low at 1.8  Will stress dose solucortef 50mg every 6 hours    Bradycardia  Assessment & Plan  Sinus bradycardia dipping into the 30's while sleeping  TSH WNL  Low cortisol     - monitor response to stress dose steroids    Hypokalemia  Assessment & Plan  Monitor and replace    Ascending cholangitis- (present on admission)  Assessment & Plan  IV unasyn  S/p ERCP - Choledocholithiasis extracted after biliary sphincterotomy      Leukocytosis- (present on admission)  Assessment & Plan  WBC 13.9 --> 5.2    Acute gallstone pancreatitis- (present on admission)  Assessment & Plan  IV unasyn  S/p ERCP - Choledocholithiasis extracted after biliary sphincterotomy  GI recs         VTE prophylaxis: VTE Selection    I have performed a physical exam and reviewed and updated ROS and Plan today (3/24/2025). In review of yesterday's note (3/23/2025), there are no changes except as documented above.    Greater than 53 minutes spent prepping to see patient (e.g. review of tests) obtaining and/or reviewing separately obtained history. Performing a medically appropriate examination and/ evaluation.  Counseling and educating the patient/family/caregiver.  Ordering medications, tests, or procedures.  Referring and communicating with other health care professionals.  Documenting clinical information in EPIC.  Independently interpreting results and communicating results to patient/family/caregiver.  Care coordination.

## 2025-03-24 NOTE — PROGRESS NOTES
Received bedside report from BRANNON Rivero, pt care assumed. VSS, pt assessment complete. Pt A&Ox4, no c/o pain at this time. POC discussed with pt and verbalizes no questions. Pt denies any additional needs at this time. Bed locked and in lowest position, bed alarm off. Pt educated on fall risk and verbalized understanding, call light within reach, hourly rounding initiated.

## 2025-03-24 NOTE — ASSESSMENT & PLAN NOTE
Sinus bradycardia dipping into the 30's while sleeping  TSH WNL  Low cortisol     - monitor response to stress dose steroids

## 2025-03-24 NOTE — PROGRESS NOTES
0705   Bedside reported received from night shift. Pt on tele running SB 35. Dr. Rodríguez updated on HR and an EKG has been ordered. She remains asymptomatic.  Call light within reach and all needs met.

## 2025-03-24 NOTE — PROGRESS NOTES
Gastroenterology Progress Note               Author:  CHRISTIAN Peck Date & Time Created: 3/24/2025 10:02 AM       Patient ID:  Name:             Elva Asher  YOB: 1970  Age:                 55 y.o.  female  MRN:               [id number]        Medical Decision Making, by Problem:  Active Hospital Problems    Diagnosis     Hypokalemia [E87.6]     Ascending cholangitis [K83.09]     Acute gallstone pancreatitis [K85.10]     Elevated LFTs [R79.89]     Leukocytosis [D72.829]            Presenting Chief Complaint:  Ascending cholangitis, acute pancreatitis     History of Present Illness:   55-year-old female status postcholecystectomy presents with recurrent gallstone pancreatitis.  Patient states this has happened once prior.  She has never had ERCP.  She is having some abdominal pain which is improving.  She developed a fever to 101.5 at [facility].  She was transferred here after being started on Unasyn.  Patient had a fever to 100.8 overnight.  She is having no rigors or chills.  She otherwise feels reasonably well.  Did an MRCP showing a small stone in the distal CBD.            Interval History:  3/24/2025: PPD#1 s/p ercp with sphincterotomy and calculus removal. Seen at bedside. Aaox4. Reports some throat soreness but otherwise feeling well without nausea, vomiting, fever, chills, abdominal pain. Reports tolerating clear liquids without difficulty or post prandial pain. No leukocytosis, hgb 10.6- 11.4.. plt 139. BUN 7, Ct 0.66, LFTs decreased- AST90, , AP 94, Tbili 0.5. Low suspicion for post ERCP bleeding, infection, pancreatitis or perforation.       Hospital Medications:  Current Facility-Administered Medications   Medication Dose Frequency Provider Last Rate Last Admin    ketorolac (Toradol) 15 MG/ML injection 15 mg  15 mg Q6HRS PRN Juancho Munroe M.D.        senna-docusate (Pericolace Or Senokot S) 8.6-50 MG per tablet 2 Tablet  2 Tablet Q EVENING Ebony Wayne M.D.   2  "Tablet at 03/23/25 1750    And    polyethylene glycol/lytes (Miralax) Packet 1 Packet  1 Packet QDAY PRN Ebnoy Wayne M.D.        ondansetron (Zofran) syringe/vial injection 4 mg  4 mg Q4HRS PRN Ebony Wayne M.D.        ondansetron (Zofran ODT) dispertab 4 mg  4 mg Q4HRS PRN TAYLOR HaynesD.        promethazine (Phenergan) tablet 12.5-25 mg  12.5-25 mg Q4HRS PRN KASHIF Haynes.D.        promethazine (Phenergan) suppository 12.5-25 mg  12.5-25 mg Q4HRS PRN TAYLOR HaynesD.        prochlorperazine (Compazine) injection 5-10 mg  5-10 mg Q4HRS PRN KASHIF Haynes.D.        ampicillin/sulbactam (Unasyn) 3 g in  mL IVPB  3 g Q6HRS Ebony Wayne M.D.   Stopped at 03/24/25 0612   Last reviewed on 3/22/2025  8:29 PM by Mary Ramírez R.N.       Review of Systems:  Review of Systems   Constitutional:  Negative for chills, fever and malaise/fatigue.   HENT:  Positive for sore throat (s/p ercp). Negative for congestion.    Respiratory:  Negative for cough and shortness of breath.    Cardiovascular:  Negative for chest pain and leg swelling.   Gastrointestinal:  Negative for abdominal pain, blood in stool, constipation, diarrhea, melena, nausea and vomiting.   Genitourinary:  Negative for dysuria and flank pain.   Musculoskeletal:  Negative for myalgias.   Neurological:  Negative for weakness and headaches.   Psychiatric/Behavioral:  The patient is not nervous/anxious and does not have insomnia.    All other systems reviewed and are negative.        Vital signs:  Weight/BMI: Body mass index is 30.44 kg/m².  /69   Pulse (!) 50   Temp 36.7 °C (98.1 °F) (Temporal)   Resp 17   Ht 1.753 m (5' 9\")   Wt 93.5 kg (206 lb 2.1 oz)   SpO2 97%   Vitals:    03/23/25 2345 03/24/25 0325 03/24/25 0500 03/24/25 0729   BP: 131/72 115/72  128/69   Pulse: (!) 44 (!) 59  (!) 50   Resp: 16 16 17   Temp: 36.6 °C (97.9 °F) 36.5 °C (97.7 °F)  36.7 °C (98.1 °F)   TempSrc: Temporal Temporal  Temporal   SpO2: 95% 96%  97% "   Weight:   93.5 kg (206 lb 2.1 oz)    Height:         Oxygen Therapy:  Pulse Oximetry: 97 %, O2 (LPM): 0, O2 Delivery Device: None - Room Air    Intake/Output Summary (Last 24 hours) at 3/24/2025 1002  Last data filed at 3/23/2025 1231  Gross per 24 hour   Intake 780 ml   Output --   Net 780 ml         Physical Exam:  Physical Exam  Vitals and nursing note reviewed.   Constitutional:       General: She is awake. She is not in acute distress.     Appearance: She is obese. She is not ill-appearing or toxic-appearing.   HENT:      Head: Normocephalic and atraumatic.      Nose: Nose normal.      Mouth/Throat:      Mouth: Mucous membranes are moist.      Pharynx: Oropharynx is clear.   Eyes:      General: No scleral icterus.     Conjunctiva/sclera: Conjunctivae normal.   Cardiovascular:      Rate and Rhythm: Regular rhythm. Bradycardia present.      Pulses: Normal pulses.      Heart sounds: Normal heart sounds.   Pulmonary:      Effort: Pulmonary effort is normal. No respiratory distress.      Breath sounds: Normal breath sounds. No wheezing.   Abdominal:      General: Abdomen is flat. Bowel sounds are normal. There is no distension.      Palpations: Abdomen is soft.      Tenderness: There is no abdominal tenderness. There is no guarding.   Musculoskeletal:      Right lower leg: No edema.      Left lower leg: No edema.   Skin:     General: Skin is warm and dry.      Capillary Refill: Capillary refill takes less than 2 seconds.      Coloration: Skin is not jaundiced or pale.   Neurological:      General: No focal deficit present.      Mental Status: She is alert and oriented to person, place, and time. Mental status is at baseline.   Psychiatric:         Mood and Affect: Mood normal.         Behavior: Behavior normal. Behavior is cooperative.             Labs:  Recent Labs     03/22/25  0205 03/23/25  0123 03/24/25  0033   SODIUM 141 139 141   POTASSIUM 3.6 3.3* 4.1   CHLORIDE 106 108 110   CO2 23 18* 22   BUN 11 13 7*    CREATININE 0.74 0.72 0.66   MAGNESIUM  --  1.9 2.1   PHOSPHORUS  --   --  1.8*   CALCIUM 8.8 7.9* 8.2*     Recent Labs     03/21/25 2036 03/22/25 0205 03/23/25 0123 03/24/25  0033   ALTSGPT 677* 535* 288* 233*   ASTSGOT 926* 516* 130* 90*   ALKPHOSPHAT 113* 94 78 94   TBILIRUBIN 1.4 0.8 0.8 0.5   LIPASE 2481*  --   --   --    GLUCOSE 118* 112* 76 124*     Recent Labs     03/21/25 2036 03/22/25 0205 03/23/25 0123 03/23/25 0225 03/24/25  0033   WBC 13.9* 12.3*  --  5.5 5.2   NEUTSPOLYS 86.40*  --   --   --  68.30   LYMPHOCYTES 5.10*  --   --   --  19.20*   MONOCYTES 7.40  --   --   --  11.30   EOSINOPHILS 0.20  --   --   --  0.40   BASOPHILS 0.50  --   --   --  0.40   ASTSGOT 926* 516* 130*  --  90*   ALTSGPT 677* 535* 288*  --  233*   ALKPHOSPHAT 113* 94 78  --  94   TBILIRUBIN 1.4 0.8 0.8  --  0.5     Recent Labs     03/22/25 0205 03/23/25 0225 03/24/25  0033   RBC 4.29 3.33* 3.65*   HEMOGLOBIN 13.3 10.6* 11.4*   HEMATOCRIT 40.4 32.4* 34.2*   PLATELETCT 205 124* 139*     Recent Results (from the past 24 hours)   Comp Metabolic Panel    Collection Time: 03/24/25 12:33 AM   Result Value Ref Range    Sodium 141 135 - 145 mmol/L    Potassium 4.1 3.6 - 5.5 mmol/L    Chloride 110 96 - 112 mmol/L    Co2 22 20 - 33 mmol/L    Anion Gap 9.0 7.0 - 16.0    Glucose 124 (H) 65 - 99 mg/dL    Bun 7 (L) 8 - 22 mg/dL    Creatinine 0.66 0.50 - 1.40 mg/dL    Calcium 8.2 (L) 8.5 - 10.5 mg/dL    Correct Calcium 8.7 8.5 - 10.5 mg/dL    AST(SGOT) 90 (H) 12 - 45 U/L    ALT(SGPT) 233 (H) 2 - 50 U/L    Alkaline Phosphatase 94 30 - 99 U/L    Total Bilirubin 0.5 0.1 - 1.5 mg/dL    Albumin 3.4 3.2 - 4.9 g/dL    Total Protein 5.8 (L) 6.0 - 8.2 g/dL    Globulin 2.4 1.9 - 3.5 g/dL    A-G Ratio 1.4 g/dL   MAGNESIUM    Collection Time: 03/24/25 12:33 AM   Result Value Ref Range    Magnesium 2.1 1.5 - 2.5 mg/dL   PHOSPHORUS    Collection Time: 03/24/25 12:33 AM   Result Value Ref Range    Phosphorus 1.8 (L) 2.5 - 4.5 mg/dL   CBC WITH  DIFFERENTIAL    Collection Time: 25 12:33 AM   Result Value Ref Range    WBC 5.2 4.8 - 10.8 K/uL    RBC 3.65 (L) 4.20 - 5.40 M/uL    Hemoglobin 11.4 (L) 12.0 - 16.0 g/dL    Hematocrit 34.2 (L) 37.0 - 47.0 %    MCV 93.7 81.4 - 97.8 fL    MCH 31.2 27.0 - 33.0 pg    MCHC 33.3 32.2 - 35.5 g/dL    RDW 41.4 35.9 - 50.0 fL    Platelet Count 139 (L) 164 - 446 K/uL    MPV 10.9 9.0 - 12.9 fL    Neutrophils-Polys 68.30 44.00 - 72.00 %    Lymphocytes 19.20 (L) 22.00 - 41.00 %    Monocytes 11.30 0.00 - 13.40 %    Eosinophils 0.40 0.00 - 6.90 %    Basophils 0.40 0.00 - 1.80 %    Immature Granulocytes 0.40 0.00 - 0.90 %    Nucleated RBC 0.00 0.00 - 0.20 /100 WBC    Neutrophils (Absolute) 3.52 1.82 - 7.42 K/uL    Lymphs (Absolute) 0.99 (L) 1.00 - 4.80 K/uL    Monos (Absolute) 0.58 0.00 - 0.85 K/uL    Eos (Absolute) 0.02 0.00 - 0.51 K/uL    Baso (Absolute) 0.02 0.00 - 0.12 K/uL    Immature Granulocytes (abs) 0.02 0.00 - 0.11 K/uL    NRBC (Absolute) 0.00 K/uL   ESTIMATED GFR    Collection Time: 25 12:33 AM   Result Value Ref Range    GFR (CKD-EPI) 105 >60 mL/min/1.73 m 2   EKG    Collection Time: 25  7:44 AM   Result Value Ref Range    Report       [facility]    Test Date:  2025  Pt Name:    SOO OJEDA                 Department: 171  MRN:        [id number]                      Room:       T737  Gender:     Female                       Technician: [facility]  :        1970                   Requested By:YISEL LARA  Order #:    [id number]                    Ketty MD: Mauro Perez MD    Measurements  Intervals                                Axis  Rate:       46                           P:          31  AK:         123                          QRS:        28  QRSD:       104                          T:          64  QT:         477  QTc:        418    Interpretive Statements  Sinus bradycardia  Compared to ECG 2025 21:29:42  Sinus rhythm no longer present  Electronically Signed On 2025  07:44:51 PDT by Mauro Perez MD         Radiology Review:  DX-PORTABLE FLUOROSCOPY < 1 HOUR Reason For Exam: Main OR   Preliminary Result      Portable fluoroscopy utilized for 27 seconds.         INTERPRETING LOCATION: [address] [facility], [facility], [address]      [id number] VIEW   Preliminary Result      Digitized intraoperative radiograph is submitted for review. This examination is not for diagnostic purpose but for guidance during a surgical procedure. Please see the patient's chart for full procedural details.         INTERPRETING LOCATION: [address] [facility], [facility], [address]      EC-ECHOCARDIOGRAM COMPLETE W/O CONT    (Results Pending)         MDM (Data Review):   -Records reviewed and summarized in current documentation  -I personally reviewed and interpreted the laboratory results  -I personally reviewed the radiology images    Assessment/Recommendations:  Recurrent gallstone pancreatitis  Choledocholithiasis  S/p ERCP  S/p cholecystectomy   Bradycardia    Plan:  Diet as tolerated.   Avoid NSAIDs x5 days due to risk of bleeding from sphincterotomy.  No stent placed therefore does not need OP GI follow up.   No convincing evidence on cholangiogram and balloon sweep of cholangitis. Timing of discontinuation of Unasyn defer to hospitalist.     No further inventions from acute GI team.  GI to sign off.  Please reconsult for any further questions or concerns.    Discussed with patient, Dr. Rodríguez, Dr. Lundberg      Core Quality Measures   Reviewed items::  Labs, Medications and Radiology reports reviewed

## 2025-03-24 NOTE — DISCHARGE PLANNING
Case Management Discharge Planning    Admission Date: 3/22/2025  GMLOS: 4.5  ALOS: 2    6-Clicks ADL Score: 24  6-Clicks Mobility Score: 24      Anticipated Discharge Dispo: Discharge Disposition: Discharged to home/self care (01)    DME Needed: No    Action(s) Taken: Updated Provider/Nurse on Discharge Plan and DC Assessment Complete (See below)    1005, Pt was visited at room to obtain assessment information and discuss discharge planning.     No further CM needs identified.      Escalations Completed: None    Medically Clear: Yes    Next Steps: CM will continue to assist Pt with discharge needs.      Barriers to Discharge: None    Care Transition Team Assessment  RN SHALA met with Pt at bedside to obtain assessment informations. Demographics verified. RN CM introduced self and purpose of visit.   Pt lives with spouse in a 2 story house with 1 step to main entrance.  Pt has spouse for support.  Pt has MERVIN ARMENTA M.D. for PCP  Pt was independent with ADLs and IADLs prior to hospitalization.  Pt's  will provide transport home.    Information Source  Orientation Level: Oriented X4  Information Given By: Patient    Elopement Risk  Legal Hold: No  Ambulatory or Self Mobile in Wheelchair: Yes  Disoriented: No  Psychiatric Symptoms: None  History of Wandering: No  Elopement this Admit: No  Vocalizing Wanting to Leave: No  Displays Behaviors, Body Language Wanting to Leave: No-Not at Risk for Elopement  Elopement Risk: Not at Risk for Elopement    Interdisciplinary Discharge Planning  Primary Care Physician: MERVIN ARMENTA M.D.  Lives with - Patient's Self Care Capacity: Spouse  Patient or legal guardian wants to designate a caregiver: Yes  Caregiver name: José Asher  Caregiver contact info: [phone]  Support Systems: Spouse / Significant Other  Housing / Facility: 2 Story House (with 1 step to main entrance)    Discharge Preparedness  What is your plan after discharge?: Home with help  What are your  discharge supports?: Spouse  Prior Functional Level: Independent with Activities of Daily Living, Independent with Medication Management    Functional Assesment  Prior Functional Level: Independent with Activities of Daily Living, Independent with Medication Management    Finances  Financial Barriers to Discharge: No  Prescription Coverage: Yes    Vision / Hearing Impairment  Right Eye Vision: Impaired, Wears Glasses  Left Eye Vision: Impaired, Wears Glasses       Domestic Abuse  Possible Abuse/Neglect Reported to:: Not Applicable    Psychological Assessment  History of Substance Abuse: None  History of Psychiatric Problems: No      Anticipated Discharge Information  Discharge Disposition: Discharged to home/self care (01)

## 2025-03-24 NOTE — ASSESSMENT & PLAN NOTE
In setting of critical illness  Patient is bradycardic, AM cortisol profoundly low at 1.8  Will stress dose solucortef 50mg every 6 hours

## 2025-03-25 ENCOUNTER — PHARMACY VISIT (OUTPATIENT)
Dept: PHARMACY | Facility: MEDICAL CENTER | Age: 55
End: 2025-03-25
Payer: COMMERCIAL

## 2025-03-25 VITALS
HEIGHT: 69 IN | OXYGEN SATURATION: 95 % | DIASTOLIC BLOOD PRESSURE: 80 MMHG | SYSTOLIC BLOOD PRESSURE: 112 MMHG | HEART RATE: 43 BPM | TEMPERATURE: 98.2 F | WEIGHT: 201.94 LBS | RESPIRATION RATE: 13 BRPM | BODY MASS INDEX: 29.91 KG/M2

## 2025-03-25 LAB
ALBUMIN SERPL BCP-MCNC: 3.8 G/DL (ref 3.2–4.9)
ALBUMIN/GLOB SERPL: 1.5 G/DL
ALP SERPL-CCNC: 90 U/L (ref 30–99)
ALT SERPL-CCNC: 193 U/L (ref 2–50)
ANION GAP SERPL CALC-SCNC: 12 MMOL/L (ref 7–16)
AST SERPL-CCNC: 45 U/L (ref 12–45)
BILIRUB SERPL-MCNC: 0.3 MG/DL (ref 0.1–1.5)
BUN SERPL-MCNC: 9 MG/DL (ref 8–22)
CALCIUM ALBUM COR SERPL-MCNC: 8.8 MG/DL (ref 8.5–10.5)
CALCIUM SERPL-MCNC: 8.6 MG/DL (ref 8.5–10.5)
CHLORIDE SERPL-SCNC: 109 MMOL/L (ref 96–112)
CO2 SERPL-SCNC: 24 MMOL/L (ref 20–33)
CREAT SERPL-MCNC: 0.69 MG/DL (ref 0.5–1.4)
GFR SERPLBLD CREATININE-BSD FMLA CKD-EPI: 104 ML/MIN/1.73 M 2
GLOBULIN SER CALC-MCNC: 2.5 G/DL (ref 1.9–3.5)
GLUCOSE SERPL-MCNC: 123 MG/DL (ref 65–99)
MAGNESIUM SERPL-MCNC: 2.1 MG/DL (ref 1.5–2.5)
PHOSPHATE SERPL-MCNC: 2.5 MG/DL (ref 2.5–4.5)
POTASSIUM SERPL-SCNC: 3.5 MMOL/L (ref 3.6–5.5)
PROT SERPL-MCNC: 6.3 G/DL (ref 6–8.2)
SODIUM SERPL-SCNC: 145 MMOL/L (ref 135–145)

## 2025-03-25 PROCEDURE — 83735 ASSAY OF MAGNESIUM: CPT

## 2025-03-25 PROCEDURE — 700111 HCHG RX REV CODE 636 W/ 250 OVERRIDE (IP): Mod: JZ | Performed by: INTERNAL MEDICINE

## 2025-03-25 PROCEDURE — 99239 HOSP IP/OBS DSCHRG MGMT >30: CPT | Performed by: INTERNAL MEDICINE

## 2025-03-25 PROCEDURE — 700102 HCHG RX REV CODE 250 W/ 637 OVERRIDE(OP): Performed by: INTERNAL MEDICINE

## 2025-03-25 PROCEDURE — 80053 COMPREHEN METABOLIC PANEL: CPT

## 2025-03-25 PROCEDURE — 84100 ASSAY OF PHOSPHORUS: CPT

## 2025-03-25 PROCEDURE — A9270 NON-COVERED ITEM OR SERVICE: HCPCS | Performed by: INTERNAL MEDICINE

## 2025-03-25 PROCEDURE — RXMED WILLOW AMBULATORY MEDICATION CHARGE: Performed by: INTERNAL MEDICINE

## 2025-03-25 RX ORDER — HYDROCORTISONE 20 MG/1
20 TABLET ORAL EVERY MORNING
Qty: 20 TABLET | Refills: 0 | Status: SHIPPED | OUTPATIENT
Start: 2025-03-25 | End: 2025-04-14

## 2025-03-25 RX ORDER — HYDROCORTISONE 10 MG/1
10 TABLET ORAL
Qty: 20 TABLET | Refills: 0 | Status: SHIPPED | OUTPATIENT
Start: 2025-03-25 | End: 2025-04-14

## 2025-03-25 RX ADMIN — HYDROCORTISONE SODIUM SUCCINATE 50 MG: 100 INJECTION, POWDER, FOR SOLUTION INTRAMUSCULAR; INTRAVENOUS at 05:29

## 2025-03-25 RX ADMIN — HYDROCORTISONE SODIUM SUCCINATE 50 MG: 100 INJECTION, POWDER, FOR SOLUTION INTRAMUSCULAR; INTRAVENOUS at 00:04

## 2025-03-25 RX ADMIN — AMOXICILLIN AND CLAVULANATE POTASSIUM 1 TABLET: 875; 125 TABLET, FILM COATED ORAL at 05:29

## 2025-03-25 ASSESSMENT — FIBROSIS 4 INDEX: FIB4 SCORE: 1.24

## 2025-03-25 NOTE — HOSPITAL COURSE
This is a 55 y.o. female with a history of gallstone pancreatitis in the past status post cholecystectomy with a history of post-cholecystectomy gallstone pancreatitis who presented with abdominal pain found to have gallstone pancreatitis.  She was admitted for supportive care and MRCP with possible ERCP.  MRCP was positive for CBD stone and mild dilation. She developed fever, concern for ascending cholangitis and started on IV antibiotics. GI consulted. Underwent ERCP with choledocholithiasis extracted after biliary sphincterotomy. Blood cultures negative x3 days. AST/ALT downtrended. Patient developed bradycardia. EKG showed sinus bradycardia without signs of heart block. TSH normal. Cortisol 1.8. Started on stress dose steroids with improvement in bradycardia to the 50's; previously dropping into the 30's. Patient was asymptomatic from these episodes, blood pressure ranging 110-130 systolic. I placed referral to endocrinology and cardiology. Patient will discharge on antibiotics and cortef. Patient improved clinically and was agreeable to discharge. Patient was determined satisfactory for discharge with appropriate follow up.

## 2025-03-25 NOTE — PROGRESS NOTES
Monitor summary: Day shift  Rhythm: SB  Rate: 35-57  Measurements: .16/.09/.35   Ectopy:

## 2025-03-25 NOTE — DISCHARGE SUMMARY
Discharge Summary    CHIEF COMPLAINT ON ADMISSION  No chief complaint on file.      Reason for Admission  Gastroenterology (Ascending cholan*     Admission Date  3/22/2025    CODE STATUS  Prior    HPI & HOSPITAL COURSE  This is a 55 y.o. female with a history of gallstone pancreatitis in the past status post cholecystectomy with a history of post-cholecystectomy gallstone pancreatitis who presented with abdominal pain found to have gallstone pancreatitis.  She was admitted for supportive care and MRCP with possible ERCP.  MRCP was positive for CBD stone and mild dilation. She developed fever, concern for ascending cholangitis and started on IV antibiotics. GI consulted. Underwent ERCP with choledocholithiasis extracted after biliary sphincterotomy. Blood cultures negative x3 days. AST/ALT downtrended. Patient developed bradycardia. EKG showed sinus bradycardia without signs of heart block. TSH normal. Cortisol 1.8. Started on stress dose steroids with improvement in bradycardia to the 50's; previously dropping into the 30's. Patient was asymptomatic from these episodes, blood pressure ranging 110-130 systolic. I placed referral to endocrinology and cardiology. Patient will discharge on antibiotics and cortef. Patient improved clinically and was agreeable to discharge. Patient was determined satisfactory for discharge with appropriate follow up.    Therefore, she is discharged in fair and stable condition to home with close outpatient follow-up.    The patient met 2-midnight criteria for an inpatient stay at the time of discharge.    Discharge Date  3/25/2025    FOLLOW UP ITEMS POST DISCHARGE  Please follow up with PCP in 3-5 days for post hospitalization follow up and medication reconciliation.     Referral to cardiology    Referral to endocrinology for adrenal insufficiency    DISCHARGE DIAGNOSES  Principal Problem:    Adrenal insufficiency (HCC) (POA: Unknown)  Active Problems:    Ascending cholangitis (POA:  Yes)    Hypokalemia (POA: Unknown)    Bradycardia (POA: Unknown)    Acute gallstone pancreatitis (POA: Yes)    Leukocytosis (POA: Yes)    Elevated LFTs (POA: Yes)  Resolved Problems:    * No resolved hospital problems. *      FOLLOW UP  Future Appointments   Date Time Provider Department Center   4/1/2025  8:00 AM Polly Negrete M.D. [facility] [place]   4/15/2025  1:30 PM S JOSE MG 1 [facility]   1/29/2026  8:40 AM Ángela Kilgore M.D. [facility] [place] Kilgore M.D.  [address] [facility] [place] [id number] [phone]    Follow up in 3 day(s)  PCP follow up    Carl Rebollar M.D.  [address] [place] [id number] [phone]    Follow up in 1 week(s)  Endocrinology follow up      MEDICATIONS ON DISCHARGE     Medication List        START taking these medications        Instructions   amoxicillin-clavulanate 875-125 MG Tabs  Commonly known as: Augmentin   Take 1 Tablet by mouth every 12 hours for 1 day.  Dose: 1 Tablet     * hydrocortisone 20 MG Tabs  Commonly known as: Cortef   Take 1 Tablet by mouth every morning for 20 days.  Dose: 20 mg     * hydrocortisone 10 MG Tabs  Commonly known as: Cortef   Take 1 tablet by mouth in the evening  Dose: 10 mg           * This list has 2 medication(s) that are the same as other medications prescribed for you. Read the directions carefully, and ask your doctor or other care provider to review them with you.                CONTINUE taking these medications        Instructions   CALCIUM PO   Take  by mouth.     econazole nitrate 1 % cream   Apply to affected toenails and surrounding skin     estradiol 0.1 MG/GM vaginal cream  Commonly known as: Estrace   Doctor's comments: Please cancel prescriptions from previous doctor  Insert 1 g intravaginally twice weekly.     MULTI-VITAMIN DAILY PO   Take  by mouth.     VITAMIN C PO   Take  by mouth.            STOP taking these medications      fluconazole 200 MG Tabs  Commonly known as: Diflucan               Allergies  Allergies   Allergen Reactions    Metrogel [Metronidazole]        DIET  No orders of the defined types were placed in this encounter.      LABORATORY  Lab Results   Component Value Date    SODIUM 145 03/25/2025    POTASSIUM 3.5 (L) 03/25/2025    CHLORIDE 109 03/25/2025    CO2 24 03/25/2025    GLUCOSE 123 (H) 03/25/2025    BUN 9 03/25/2025    CREATININE 0.69 03/25/2025        Lab Results   Component Value Date    WBC 5.2 03/24/2025    HEMOGLOBIN 11.4 (L) 03/24/2025    HEMATOCRIT 34.2 (L) 03/24/2025    PLATELETCT 139 (L) 03/24/2025        Total time of the discharge process exceeds 40 minutes.

## 2025-03-25 NOTE — PROGRESS NOTES
Monitor Summary  Rhythm: SB  Rate: 38-52 (touched low 30s)  Ectopy: none  Measurements: 0.13/0.07/0.41      ---12 hr Chart Review---

## 2025-03-25 NOTE — CARE PLAN
Problem: Knowledge Deficit - Standard  Goal: Patient and family/care givers will demonstrate understanding of plan of care, disease process/condition, diagnostic tests and medications  Outcome: Progressing     Problem: Pain - Standard  Goal: Alleviation of pain or a reduction in pain to the patient’s comfort goal  Outcome: Progressing     Problem: Communication  Goal: The ability to communicate needs accurately and effectively will improve  Outcome: Progressing     Problem: Safety  Goal: Will remain free from injury  Outcome: Progressing  Goal: Will remain free from falls  Outcome: Progressing     Problem: Pain Management  Goal: Pain level will decrease to patient's comfort goal  Outcome: Progressing     Problem: Infection  Goal: Will remain free from infection  Outcome: Progressing     Problem: Psychosocial Needs:  Goal: Level of anxiety will decrease  Outcome: Progressing   The patient is Stable - Low risk of patient condition declining or worsening    Shift Goals  Clinical Goals: pain control; pt safety; vss  Patient Goals: rest; pain control    Progress made toward(s) clinical / shift goals:  Pt remains free from falls at this time. Safety precautions in place. Pt educated on calling for assistance when needed.  Proper hand hygiene before and after patient care to ensure patient will remain free from infection.  Pt is able to verbalize pain on a scale of 1-10 and is able to verbalize comfort goal. Pain management plan followed and pt educated on nonpharmacologic and pharmacological comfort measures.      Patient is not progressing towards the following goals:      
  Problem: Knowledge Deficit - Standard  Goal: Patient and family/care givers will demonstrate understanding of plan of care, disease process/condition, diagnostic tests and medications  Outcome: Progressing     Problem: Pain - Standard  Goal: Alleviation of pain or a reduction in pain to the patient’s comfort goal  Outcome: Progressing     Problem: Communication  Goal: The ability to communicate needs accurately and effectively will improve  Outcome: Progressing     Problem: Safety  Goal: Will remain free from injury  Outcome: Progressing  Goal: Will remain free from falls  Outcome: Progressing     Problem: Pain Management  Goal: Pain level will decrease to patient's comfort goal  Outcome: Progressing     Problem: Infection  Goal: Will remain free from infection  Outcome: Progressing     Problem: Psychosocial Needs:  Goal: Level of anxiety will decrease  Outcome: Progressing   The patient is Stable - Low risk of patient condition declining or worsening    Shift Goals  Clinical Goals: vss; pt safety  Patient Goals: pain control; rest    Progress made toward(s) clinical / shift goals:  Pt remains free from falls at this time. Safety precautions in place. Pt educated on calling for assistance when needed.  Proper hand hygiene before and after patient care to ensure patient will remain free from infection.  Pt is able to verbalize pain on a scale of 1-10 and is able to verbalize comfort goal. Pain management plan followed and pt educated on nonpharmacologic and pharmacological comfort measures.      Patient is not progressing towards the following goals:      
The patient is Stable - Low risk of patient condition declining or worsening    Shift Goals  Clinical Goals: HR monitoring, rest, comfort  Patient Goals: updates, HR control    Progress made toward(s) clinical / shift goals:      Problem: Pain - Standard  Goal: Alleviation of pain or a reduction in pain to the patient’s comfort goal  Outcome: Progressing     Problem: Safety  Goal: Will remain free from injury  Outcome: Progressing     Problem: Pain Management  Goal: Pain level will decrease to patient's comfort goal  Outcome: Progressing     Problem: Infection  Goal: Will remain free from infection  Outcome: Progressing       Patient is not progressing towards the following goals:      
The patient is Stable - Low risk of patient condition declining or worsening    Shift Goals  Clinical Goals: VSS  Patient Goals: comfort    Progress made toward(s) clinical / shift goals:    Problem: Knowledge Deficit - Standard  Goal: Patient and family/care givers will demonstrate understanding of plan of care, disease process/condition, diagnostic tests and medications  Outcome: Progressing     Problem: Pain - Standard  Goal: Alleviation of pain or a reduction in pain to the patient’s comfort goal  Outcome: Progressing     Problem: Communication  Goal: The ability to communicate needs accurately and effectively will improve  Outcome: Progressing     Problem: Safety  Goal: Will remain free from injury  Outcome: Progressing       Patient is not progressing towards the following goals:      
The patient is Stable - Low risk of patient condition declining or worsening    Shift Goals  Clinical Goals: VSS, pain control, monitor labs  Patient Goals: comfort    Progress made toward(s) clinical / shift goals:    Problem: Knowledge Deficit - Standard  Goal: Patient and family/care givers will demonstrate understanding of plan of care, disease process/condition, diagnostic tests and medications  Outcome: Progressing     Problem: Pain - Standard  Goal: Alleviation of pain or a reduction in pain to the patient’s comfort goal  Outcome: Progressing     Problem: Safety  Goal: Will remain free from injury  Outcome: Progressing     Problem: Pain Management  Goal: Pain level will decrease to patient's comfort goal  Outcome: Progressing       Patient is not progressing towards the following goals:      
No

## 2025-03-27 LAB
BACTERIA BLD CULT: NORMAL
BACTERIA BLD CULT: NORMAL
SIGNIFICANT IND 70042: NORMAL
SIGNIFICANT IND 70042: NORMAL
SITE SITE: NORMAL
SITE SITE: NORMAL
SOURCE SOURCE: NORMAL
SOURCE SOURCE: NORMAL

## 2025-04-01 ENCOUNTER — OFFICE VISIT (OUTPATIENT)
Dept: MEDICAL GROUP | Facility: PHYSICIAN GROUP | Age: 55
End: 2025-04-01
Payer: OTHER GOVERNMENT

## 2025-04-01 VITALS
WEIGHT: 193 LBS | HEIGHT: 69 IN | OXYGEN SATURATION: 99 % | DIASTOLIC BLOOD PRESSURE: 66 MMHG | BODY MASS INDEX: 28.58 KG/M2 | HEART RATE: 86 BPM | TEMPERATURE: 97.7 F | SYSTOLIC BLOOD PRESSURE: 110 MMHG

## 2025-04-01 DIAGNOSIS — Z09 HOSPITAL DISCHARGE FOLLOW-UP: ICD-10-CM

## 2025-04-01 DIAGNOSIS — K83.09 ASCENDING CHOLANGITIS: ICD-10-CM

## 2025-04-01 DIAGNOSIS — R00.1 BRADYCARDIA: ICD-10-CM

## 2025-04-01 DIAGNOSIS — K85.10 ACUTE GALLSTONE PANCREATITIS: ICD-10-CM

## 2025-04-01 DIAGNOSIS — E27.40 ADRENAL INSUFFICIENCY (HCC): ICD-10-CM

## 2025-04-01 PROCEDURE — 3078F DIAST BP <80 MM HG: CPT | Performed by: STUDENT IN AN ORGANIZED HEALTH CARE EDUCATION/TRAINING PROGRAM

## 2025-04-01 PROCEDURE — 99214 OFFICE O/P EST MOD 30 MIN: CPT | Performed by: STUDENT IN AN ORGANIZED HEALTH CARE EDUCATION/TRAINING PROGRAM

## 2025-04-01 PROCEDURE — 3074F SYST BP LT 130 MM HG: CPT | Performed by: STUDENT IN AN ORGANIZED HEALTH CARE EDUCATION/TRAINING PROGRAM

## 2025-04-01 ASSESSMENT — FIBROSIS 4 INDEX: FIB4 SCORE: 1.24

## 2025-04-01 NOTE — PROGRESS NOTES
Subjective:   Verbal consent was acquired by the patient to use [employer] ambient listening note generation during this visit Yes     Elva Asher is a 55 y.o. female who presents for Hospital Follow-up.       History of Present Illness  The patient presents for evaluation of bradycardia and pancreatitis.    She experienced a recurrence of pancreatitis symptoms on Friday, prompting an emergency room visit at [facility] where she was diagnosed with pancreatitis. During her hospital stay, she developed a fever and was subsequently transferred to the main hospital for a procedure on Sunday morning. She was discharged with a prescription for hydrocortisone, to be taken twice daily, and a referral to an endocrinologist. She also received 2 antibiotics, which she has already completed. She reports that her abdominal pain has resolved and her appetite remains good.    During the procedure, her heart rate dropped to the 30s, necessitating monitoring to ensure her safety. This led to an additional day of hospitalization, during which an EKG and echocardiogram were performed, revealing no structural abnormalities in her heart. She was prescribed hydrocortisone, 1 tablet in the morning and 1 at night, which she has been taking. She has been monitoring her heart rate with her watch and notes that it appears to be improving. She was given a 20-day supply of hydrocortisone without refills. She is seeking a referral to an endocrinologist and cardiologist. She is planning a trip to [place] on Friday and is inquiring about her fitness to travel. She has observed a decrease in her blood pressure during recent doctor visits and is questioning whether this could be related to her low heart rate.    MEDICATIONS  Current: Hydrocortisone        Current medicines (including reconciliation performed today)  Current Outpatient Medications   Medication Sig Dispense Refill    hydrocortisone (CORTEF) 20 MG Tab Take 1 Tablet by mouth  "every morning for 20 days. 20 Tablet 0    hydrocortisone (CORTEF) 10 MG Tab Take 1 tablet by mouth in the evening 20 Tablet 0    estradiol (ESTRACE) 0.1 MG/GM vaginal cream Insert 1 g intravaginally twice weekly. 42.5 g 3    econazole nitrate 1 % cream Apply to affected toenails and surrounding skin 30 g 3    Multiple Vitamin (MULTI-VITAMIN DAILY PO) Take  by mouth.      Ascorbic Acid (VITAMIN C PO) Take  by mouth.      CALCIUM PO Take  by mouth.       No current facility-administered medications for this visit.       Allergies:   Metrogel [metronidazole]    Social History     Tobacco Use    Smoking status: Never    Smokeless tobacco: Never   Vaping Use    Vaping status: Former    Substances: THC   Substance Use Topics    Alcohol use: Yes     Comment: Socially    Drug use: Yes     Frequency: 2.0 times per week     Types: Marijuana, Inhaled     Comment: Help sleep and stop night sweats       ROS:  Negative, except as noted above    Objective:     Vitals:    04/01/25 0753   BP: 110/66   BP Location: Right arm   Patient Position: Sitting   BP Cuff Size: Adult   Pulse: 86   Temp: 36.5 °C (97.7 °F)   TempSrc: Temporal   SpO2: 99%   Weight: 87.5 kg (193 lb)   Height: 1.753 m (5' 9\")     Body mass index is 28.5 kg/m².    Physical Exam:  Physical Exam  Constitutional:       Appearance: Normal appearance.   Cardiovascular:      Rate and Rhythm: Normal rate and regular rhythm.      Heart sounds: Normal heart sounds.   Pulmonary:      Effort: Pulmonary effort is normal. No respiratory distress.      Breath sounds: Normal breath sounds. No stridor. No wheezing, rhonchi or rales.   Neurological:      Mental Status: She is alert.           Assessment and Plan:     1. Hospital discharge follow-up  2. Acute gallstone pancreatitis  3. Ascending cholangitis  4. Adrenal insufficiency (HCC)  5. Bradycardia  Patient was hospitalized from 3/21/2025 to 3/25/2025 for acute gallstone pancreatitis and ascending cholangitis.  Patient had an " MRCP completed which was positive for CBD stone and mild dilation.  There were concern for a sending cholangitis and patient was started on IV antibiotics.  GI was consulted and patient underwent ERCP with choledocholithiasis extracted after biliary sphincterectomy.  Blood cultures were collected and were negative x 3 days.  Patient's AST/ALT began to downtrend.  During the hospitalization patient developed bradycardia.  EKG revealed sinus bradycardia without signs of heart block.  Patient had an echocardiogram completed which revealed normal left ventricular size, wall thickness, systolic and diastolic function.  Right ventricle size and systolic function was normal.  Patient had a cortisol level checked and it was 1.8.  She was started on stress dose steroids with improvement of the bradycardia.  Patient was discharged home on hydrocortisone 30 mg daily.  I have reached out to endocrinology for guidance on if patient needs to continue to be on steroids versus tapering patient off.  Referral for endocrinology and cardiology have been placed.  Will follow-up with patient closely.  Once I have heard back from endocrinology I will notify the patient of the plan.  - Referral to Endocrinology    Follow-up:Return in about 2 months (around 6/1/2025).

## 2025-04-02 ENCOUNTER — TELEPHONE (OUTPATIENT)
Dept: HEALTH INFORMATION MANAGEMENT | Facility: OTHER | Age: 55
End: 2025-04-02
Payer: OTHER GOVERNMENT

## 2025-04-04 NOTE — Clinical Note
REFERRAL APPROVAL NOTICE         Sent on April 4, 2025                   Elva Asher  [address] [address]                   Dear Ms. Asher,    After a careful review of the medical information and benefit coverage, [facility] has processed your referral. See below for additional details.    If applicable, you must be actively enrolled with your insurance for coverage of the authorized service. If you have any questions regarding your coverage, please contact your insurance directly.    REFERRAL INFORMATION   Referral #:  [id number]  Referred-To Department    Referred-By Provider:  Endocrinology    Polly Negrete M.D.   Endocrinology [facility]      [address] [id number] [phone] [address] [facility] [address], [address]  [facility] [address] [phone]    Referral Start Date:  04/01/2025  Referral End Date:   04/01/2026           SCHEDULING  If you do not already have an appointment, please call [phone] to make an appointment.   MORE INFORMATION  As a reminder, [facility] ownership has changed, meaning this location is now owned and operated by [facility]. As such, we want to clarify that our patients should expect to receive two separate bills for the services received at [facility] - one representing the [facility] facility fees as the owner of the establishment, and the other to represent the physician's services and subsequent fees. You can speak with your insurance carrier for a pricing estimate by calling the customer service number on the back of your card and ask about charges for a hospital outpatient visit.  If you do not already have a [employer] account, sign up at: [email or web address]  You can access your medical information, make appointments, see lab results, billing information, and more.  If you have questions regarding this referral, please contact  the [facility] Referrals department at:             [phone]. Monday  - Friday 7:30AM - 5:00PM.      Sincerely,  [facility]

## 2025-04-08 ENCOUNTER — PATIENT MESSAGE (OUTPATIENT)
Dept: MEDICAL GROUP | Facility: PHYSICIAN GROUP | Age: 55
End: 2025-04-08
Payer: OTHER GOVERNMENT

## 2025-04-08 DIAGNOSIS — E27.40 ADRENAL INSUFFICIENCY (HCC): ICD-10-CM

## 2025-04-15 ENCOUNTER — HOSPITAL ENCOUNTER (OUTPATIENT)
Dept: LAB | Facility: MEDICAL CENTER | Age: 55
End: 2025-04-15
Attending: STUDENT IN AN ORGANIZED HEALTH CARE EDUCATION/TRAINING PROGRAM
Payer: OTHER GOVERNMENT

## 2025-04-15 DIAGNOSIS — E27.40 ADRENAL INSUFFICIENCY (HCC): ICD-10-CM

## 2025-04-15 PROCEDURE — 84244 ASSAY OF RENIN: CPT

## 2025-04-15 PROCEDURE — 82024 ASSAY OF ACTH: CPT

## 2025-04-15 PROCEDURE — 82626 DEHYDROEPIANDROSTERONE: CPT

## 2025-04-15 PROCEDURE — 36415 COLL VENOUS BLD VENIPUNCTURE: CPT

## 2025-04-15 PROCEDURE — 82088 ASSAY OF ALDOSTERONE: CPT

## 2025-04-17 ENCOUNTER — HOSPITAL ENCOUNTER (OUTPATIENT)
Facility: MEDICAL CENTER | Age: 55
End: 2025-04-17
Attending: STUDENT IN AN ORGANIZED HEALTH CARE EDUCATION/TRAINING PROGRAM
Payer: OTHER GOVERNMENT

## 2025-04-17 ENCOUNTER — OFFICE VISIT (OUTPATIENT)
Dept: MEDICAL GROUP | Facility: PHYSICIAN GROUP | Age: 55
End: 2025-04-17
Payer: OTHER GOVERNMENT

## 2025-04-17 VITALS
DIASTOLIC BLOOD PRESSURE: 52 MMHG | HEIGHT: 69 IN | WEIGHT: 190.6 LBS | HEART RATE: 96 BPM | TEMPERATURE: 98.2 F | BODY MASS INDEX: 28.23 KG/M2 | SYSTOLIC BLOOD PRESSURE: 100 MMHG | OXYGEN SATURATION: 96 %

## 2025-04-17 DIAGNOSIS — E27.40 ADRENAL INSUFFICIENCY (HCC): ICD-10-CM

## 2025-04-17 DIAGNOSIS — R00.1 BRADYCARDIA: ICD-10-CM

## 2025-04-17 DIAGNOSIS — N89.8 VAGINAL DISCHARGE: ICD-10-CM

## 2025-04-17 DIAGNOSIS — Z23 NEED FOR VACCINATION: ICD-10-CM

## 2025-04-17 DIAGNOSIS — N89.8 VAGINAL DRYNESS: ICD-10-CM

## 2025-04-17 LAB
ACTH PLAS-MCNC: 17.2 PG/ML (ref 7.2–63.3)
ALDOST SERPL-MCNC: 8.5 NG/DL
CANDIDA DNA VAG QL PROBE+SIG AMP: POSITIVE
G VAGINALIS DNA VAG QL PROBE+SIG AMP: POSITIVE
T VAGINALIS DNA VAG QL PROBE+SIG AMP: NEGATIVE

## 2025-04-17 PROCEDURE — 3078F DIAST BP <80 MM HG: CPT | Performed by: STUDENT IN AN ORGANIZED HEALTH CARE EDUCATION/TRAINING PROGRAM

## 2025-04-17 PROCEDURE — 99214 OFFICE O/P EST MOD 30 MIN: CPT | Mod: 25 | Performed by: STUDENT IN AN ORGANIZED HEALTH CARE EDUCATION/TRAINING PROGRAM

## 2025-04-17 PROCEDURE — 87480 CANDIDA DNA DIR PROBE: CPT

## 2025-04-17 PROCEDURE — 87660 TRICHOMONAS VAGIN DIR PROBE: CPT

## 2025-04-17 PROCEDURE — 90471 IMMUNIZATION ADMIN: CPT | Performed by: STUDENT IN AN ORGANIZED HEALTH CARE EDUCATION/TRAINING PROGRAM

## 2025-04-17 PROCEDURE — 90677 PCV20 VACCINE IM: CPT | Performed by: STUDENT IN AN ORGANIZED HEALTH CARE EDUCATION/TRAINING PROGRAM

## 2025-04-17 PROCEDURE — 87510 GARDNER VAG DNA DIR PROBE: CPT

## 2025-04-17 PROCEDURE — 3074F SYST BP LT 130 MM HG: CPT | Performed by: STUDENT IN AN ORGANIZED HEALTH CARE EDUCATION/TRAINING PROGRAM

## 2025-04-17 ASSESSMENT — FIBROSIS 4 INDEX: FIB4 SCORE: 1.24

## 2025-04-17 NOTE — PROGRESS NOTES
"Subjective:     Chief Complaint   Patient presents with    Follow-Up     Pain in vagina, wants pelvic pain x4-5days         History of Present Illness  The patient presents for evaluation of adrenal insufficiency, vaginal pressure, and low heart rate.    Adrenal insufficiency was diagnosed during a recent hospital stay with cortisol 1.8. Hydrocortisone was prescribed 20 mg in the morning and 10 mg in the evening, with the last dose taken on Monday morning. Currently, she feels well off the steroids.  Additional labs were ordered by Dr. Negrete, as requested by Endocrinology.    Heart rate is being closely monitored using a watch and upcoming appointment with cardiology on 5/9. An echocardiogram and EKG revealed a normal heart structure but a low heart rate. The lowest recorded heart rate during sleep was 50, and it was 56 last night.    A sensation of vaginal pressure is reported, believed to be due to lifting heavy objects while assisting her son with moving over the past weekend. A bulge in the vagina was observed, a symptom experienced before but previously resolved spontaneously. Vaginal dryness and discomfort are also reported. History of hysterectomy. A yeast infection is suspected, and a swab test is requested. No urinary issues or difficulty emptying the bladder are reported. Vaginal estrogen is currently used twice weekly, with a sufficient supply available.      PAST SURGICAL HISTORY:  Hysterectomy        Health Maintenance: Completed    ROS:  Negative except as stated above.      Objective:     Exam:  /52 (BP Location: Left arm, Patient Position: Sitting, BP Cuff Size: Adult)   Pulse 96   Temp 36.8 °C (98.2 °F) (Temporal)   Ht 1.753 m (5' 9\")   Wt 86.5 kg (190 lb 9.6 oz)   SpO2 96%   BMI 28.15 kg/m²  Body mass index is 28.15 kg/m².    Physical Exam    : Chaperone (Melba) was used for the visit. Perineum and external genitalia normal without rash. Vagina covered by vaginal estrogen. No " visible or palpable bulge.    Assessment & Plan:     55 y.o. female with the following -     1. Adrenal insufficiency (HCC)  Newly diagnosed during recent hospitalization.  Cortisol was 1.8.  She was taking hydrocortisone 20 mg in the morning and 10 mg in the evening until a few days ago.  Additional labs were requested by Endocrinology before referral could be authorized.  ACTH WNL and renin, DHEA, aldosterone still pending.  Repeat cortisol level ordered.  - CORTISOL - AM    2. Vaginal discharge  Acute.  Vaginal swab done today.  - VAGINAL PATHOGENS DNA PANEL; Future  - Referral to OB/Gyn    3. Vaginal dryness  Chronic.  2 instances where she has felt a vaginal bulge and pelvic exam was unremarkable.  Vaginal estrogen increased from twice weekly to daily.  Given referral to OBGYN for further evaluation.  - Referral to OB/Gyn    4. Bradycardia  Newly diagnosed during recent hospitalization.  EKG showed bradycardia and Echo normal.  HR 96 today.  Appointment with cardiology on 5/9.    5. Need for vaccination  - Pneumococcal Conjugate Vaccine 20-Valent (6 wks+)          I spent a total of 30 minutes with record review, exam, communication with the patient, communication with other providers, and documentation of this encounter.      Return in about 41 weeks (around 1/29/2026), or if symptoms worsen or fail to improve.    Verbal consent was acquired by the patient to use [employer] ambient listening note generation during this visit: Yes.    Please note that this dictation was created using voice recognition software. I have made every reasonable attempt to correct obvious errors, but I expect that there are errors of grammar and possibly content that I did not discover before finalizing the note.         No

## 2025-04-18 ENCOUNTER — RESULTS FOLLOW-UP (OUTPATIENT)
Dept: MEDICAL GROUP | Facility: PHYSICIAN GROUP | Age: 55
End: 2025-04-18

## 2025-04-18 DIAGNOSIS — B96.89 BV (BACTERIAL VAGINOSIS): ICD-10-CM

## 2025-04-18 DIAGNOSIS — N76.0 BV (BACTERIAL VAGINOSIS): ICD-10-CM

## 2025-04-18 DIAGNOSIS — B37.9 YEAST INFECTION: ICD-10-CM

## 2025-04-18 RX ORDER — FLUCONAZOLE 150 MG/1
150 TABLET ORAL ONCE
Qty: 1 TABLET | Refills: 0 | Status: SHIPPED | OUTPATIENT
Start: 2025-04-18 | End: 2025-04-18

## 2025-04-18 RX ORDER — CLINDAMYCIN PHOSPHATE 20 MG/G
1 CREAM VAGINAL NIGHTLY
Qty: 40 G | Refills: 0 | Status: SHIPPED | OUTPATIENT
Start: 2025-04-18 | End: 2025-04-25

## 2025-04-19 LAB
DHEA SERPL-MCNC: 1.25 NG/ML (ref 0.63–4.7)
RENIN PLAS-CCNC: 0.4 NG/ML/HR

## 2025-04-21 ENCOUNTER — HOSPITAL ENCOUNTER (OUTPATIENT)
Dept: LAB | Facility: MEDICAL CENTER | Age: 55
End: 2025-04-21
Attending: STUDENT IN AN ORGANIZED HEALTH CARE EDUCATION/TRAINING PROGRAM
Payer: OTHER GOVERNMENT

## 2025-04-21 LAB — CORTIS SERPL-MCNC: 20.5 UG/DL (ref 0–23)

## 2025-04-21 PROCEDURE — 82533 TOTAL CORTISOL: CPT

## 2025-04-21 PROCEDURE — 36415 COLL VENOUS BLD VENIPUNCTURE: CPT

## 2025-04-22 ENCOUNTER — HOSPITAL ENCOUNTER (OUTPATIENT)
Dept: RADIOLOGY | Facility: MEDICAL CENTER | Age: 55
End: 2025-04-22
Attending: STUDENT IN AN ORGANIZED HEALTH CARE EDUCATION/TRAINING PROGRAM
Payer: OTHER GOVERNMENT

## 2025-04-22 DIAGNOSIS — Z12.31 ENCOUNTER FOR SCREENING MAMMOGRAM FOR MALIGNANT NEOPLASM OF BREAST: ICD-10-CM

## 2025-04-22 PROCEDURE — 77067 SCR MAMMO BI INCL CAD: CPT

## 2025-04-24 ENCOUNTER — RESULTS FOLLOW-UP (OUTPATIENT)
Dept: MEDICAL GROUP | Facility: PHYSICIAN GROUP | Age: 55
End: 2025-04-24

## 2025-04-24 NOTE — Clinical Note
REFERRAL APPROVAL NOTICE         Sent on April 24, 2025                   Elva Asher  [address] [address]                   Dear Ms. Asher,    After a careful review of the medical information and benefit coverage, [facility] has processed your referral. See below for additional details.    If applicable, you must be actively enrolled with your insurance for coverage of the authorized service. If you have any questions regarding your coverage, please contact your insurance directly.    REFERRAL INFORMATION   Referral #:  [id number]  Referred-To Department    Referred-By Provider:  OB/Gyn    Ángela Kilgore M.D.   [facility]      [address] [id number] [phone] [address], [address]  [facility] [address] [phone]    Referral Start Date:  04/17/2025  Referral End Date:   04/17/2026             SCHEDULING  If you do not already have an appointment, please call [phone] to make an appointment.     MORE INFORMATION  If you do not already have a [employer] account, sign up at: [email or web address]  You can access your medical information, make appointments, see lab results, billing information, and more.  If you have questions regarding this referral, please contact  the [facility] Referrals department at:             [phone]. Monday - Friday 8:00AM - 5:00PM.     Sincerely,    [facility]

## 2025-04-29 ENCOUNTER — OFFICE VISIT (OUTPATIENT)
Dept: URGENT CARE | Facility: PHYSICIAN GROUP | Age: 55
End: 2025-04-29
Payer: OTHER GOVERNMENT

## 2025-04-29 ENCOUNTER — TELEPHONE (OUTPATIENT)
Dept: CARDIOLOGY | Facility: MEDICAL CENTER | Age: 55
End: 2025-04-29
Payer: OTHER GOVERNMENT

## 2025-04-29 VITALS
HEIGHT: 69 IN | OXYGEN SATURATION: 99 % | DIASTOLIC BLOOD PRESSURE: 80 MMHG | HEART RATE: 79 BPM | SYSTOLIC BLOOD PRESSURE: 126 MMHG | WEIGHT: 192.13 LBS | RESPIRATION RATE: 19 BRPM | TEMPERATURE: 97.2 F | BODY MASS INDEX: 28.46 KG/M2

## 2025-04-29 DIAGNOSIS — S30.860A TICK BITE OF LOWER BACK, INITIAL ENCOUNTER: ICD-10-CM
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
## 2025-04-29 RX ORDER — DOXYCYCLINE HYCLATE 100 MG
100 TABLET ORAL 2 TIMES DAILY
Qty: 20 TABLET | Refills: 0 | Status: SHIPPED | OUTPATIENT
Start: 2025-04-29 | End: 2025-05-09

## 2025-04-29 ASSESSMENT — FIBROSIS 4 INDEX: FIB4 SCORE: 1.24

## 2025-04-29 NOTE — PROGRESS NOTES
"Subjective:   Elva Asher is a 55 y.o. female who presents for Bump (Noticed bump/sore on lower right hip, noticed it a week or so after going on hike at the beginning of this month, believes it might be a tick.)      Multiple tick bites while hiking in [place], she scraped one off and it left a paul with redness.        Review of Systems   Skin:  Positive for rash.       Medications, Allergies, and current problem list reviewed today in Epic.     Objective:     /80 (BP Location: Right arm, Patient Position: Sitting, BP Cuff Size: Adult)   Pulse 79   Temp 36.2 °C (97.2 °F) (Temporal)   Resp 19   Ht 1.753 m (5' 9\")   Wt 87.1 kg (192 lb 2.1 oz)   SpO2 99%     Physical Exam  Vitals and nursing note reviewed.   Skin:     Comments: Small area on the right flank, red, swollen, where tick bite occurred.         Assessment/Plan:     Diagnosis and associated orders:     1. Tick bite of lower back, initial encounter  doxycycline (VIBRAMYCIN) 100 MG Tab         Comments/MDM:              Differential diagnosis, natural history, supportive care, and indications for immediate follow-up discussed.    Advised the patient to follow-up with the primary care physician for recheck, reevaluation, and consideration of further management.    Please note that this dictation was created using voice recognition software. I have made a reasonable attempt to correct obvious errors, but I expect that there are errors of grammar and possibly content that I did not discover before finalizing the note.    This note was electronically signed by Juan Chapa M.D.  "

## 2025-05-09 ENCOUNTER — OFFICE VISIT (OUTPATIENT)
Dept: CARDIOLOGY | Facility: MEDICAL CENTER | Age: 55
End: 2025-05-09
Attending: INTERNAL MEDICINE
Payer: OTHER GOVERNMENT

## 2025-05-09 VITALS
OXYGEN SATURATION: 99 % | WEIGHT: 193 LBS | HEIGHT: 69 IN | SYSTOLIC BLOOD PRESSURE: 128 MMHG | HEART RATE: 91 BPM | BODY MASS INDEX: 28.58 KG/M2 | DIASTOLIC BLOOD PRESSURE: 74 MMHG | RESPIRATION RATE: 14 BRPM

## 2025-05-09 DIAGNOSIS — E78.5 DYSLIPIDEMIA: ICD-10-CM

## 2025-05-09 DIAGNOSIS — R00.1 BRADYCARDIA: ICD-10-CM

## 2025-05-09 LAB — EKG IMPRESSION: NORMAL

## 2025-05-09 PROCEDURE — 93010 ELECTROCARDIOGRAM REPORT: CPT | Performed by: INTERNAL MEDICINE

## 2025-05-09 PROCEDURE — 3074F SYST BP LT 130 MM HG: CPT | Performed by: INTERNAL MEDICINE

## 2025-05-09 PROCEDURE — 93005 ELECTROCARDIOGRAM TRACING: CPT | Mod: TC | Performed by: INTERNAL MEDICINE

## 2025-05-09 PROCEDURE — 3078F DIAST BP <80 MM HG: CPT | Performed by: INTERNAL MEDICINE

## 2025-05-09 PROCEDURE — 99212 OFFICE O/P EST SF 10 MIN: CPT | Performed by: INTERNAL MEDICINE

## 2025-05-09 PROCEDURE — 99204 OFFICE O/P NEW MOD 45 MIN: CPT | Performed by: INTERNAL MEDICINE

## 2025-05-09 ASSESSMENT — ENCOUNTER SYMPTOMS
BLURRED VISION: 0
CARDIOVASCULAR NEGATIVE: 1
GASTROINTESTINAL NEGATIVE: 1
WEAKNESS: 0
PSYCHIATRIC NEGATIVE: 1
BRUISES/BLEEDS EASILY: 0
NAUSEA: 0
NERVOUS/ANXIOUS: 0
CONSTITUTIONAL NEGATIVE: 1
DIZZINESS: 0
FOCAL WEAKNESS: 0
COUGH: 0
DOUBLE VISION: 0
EYES NEGATIVE: 1
CLAUDICATION: 0
SHORTNESS OF BREATH: 0
HEADACHES: 0
MUSCULOSKELETAL NEGATIVE: 1
WEIGHT LOSS: 0
VOMITING: 0
PALPITATIONS: 0
MYALGIAS: 0
RESPIRATORY NEGATIVE: 1
FEVER: 0
NEUROLOGICAL NEGATIVE: 1
CHILLS: 0
ABDOMINAL PAIN: 0
DEPRESSION: 0

## 2025-05-09 ASSESSMENT — FIBROSIS 4 INDEX: FIB4 SCORE: 1.24

## 2025-05-09 NOTE — PROGRESS NOTES
Chief Complaint   Patient presents with    New Patient     Per referral:Bradycardia       Subjective     Elva Asher is a 55 y.o. female who presents today as a consult from Alfred Soria for bradycardia.    Thank you for allowing me to evaluate Mrs. Asher, who as you know is a 55 year old female with dyslipidemia, lifelong nonsmoker, no family history of coronary artery disease. She was recently admitted to [facility] for abdominal pain related to gallstone pancreatitis requiring ERCP complicated with possible adrenal insufficiency treated with steroid therapy. Bradycardia was noted during this admission.  Since the discharge she has been doing well clinically from cardiac standpoint. She denies fatigue, chest pain, shortness of breath, palpitations, lower extremity edema, dizziness or syncope. She has started to exercise.    Past Medical History:   Diagnosis Date    Hyperlipidemia     Pancreatitis due to biliary obstruction      Past Surgical History:   Procedure Laterality Date    [place] ERCP,DIAGNOSTIC N/A 3/23/2025    Procedure: ERCP (ENDOSCOPIC RETROGRADE CHOLANGIOPANCREATOGRAPHY);  Surgeon: Wiliam Lundberg M.D.;  Location: SURGERY [facility];  Service: Gastroenterology    MA ERCP,W/REMOVAL STONE,FAISAL/PANCR DUCTS N/A 3/23/2025    Procedure: ERCP, WITH CALCULUS REMOVAL;  Surgeon: Wiliam Lundberg M.D.;  Location: SURGERY [facility];  Service: Gastroenterology    SPHINCTEROTOMY N/A 3/23/2025    Procedure: SPHINCTEROTOMY;  Surgeon: Wiliam Lundberg M.D.;  Location: [facility];  Service: Gastroenterology    ABDOMINAL HYSTERECTOMY TOTAL      CHOLECYSTECTOMY      OOPHORECTOMY Right      Family History   Problem Relation Age of Onset    Hyperlipidemia Mother     Cancer Mother         melanoma    Diabetes Father     Other Father         hepatitis c    Heart Disease Maternal Grandmother     Hypertension Maternal Grandmother     Cancer Other         great uncle had melanoma    Stroke Neg Hx      Ovarian Cancer Neg Hx     Tubal Cancer Neg Hx     Peritoneal Cancer Neg Hx     Colorectal Cancer Neg Hx     Breast Cancer Neg Hx     Heart Attack Neg Hx      Social History     Socioeconomic History    Marital status:      Spouse name: Not on file    Number of children: 2    Years of education: Not on file    Highest education level: Bachelor's degree (e.g., BA, AB, BS)   Occupational History     Employer: NOT EMPLOYED   Tobacco Use    Smoking status: Never    Smokeless tobacco: Never   Vaping Use    Vaping status: Former    Substances: THC   Substance and Sexual Activity    Alcohol use: Yes     Comment: Socially    Drug use: Not Currently     Frequency: 2.0 times per week     Types: Marijuana, Inhaled     Comment: Help sleep and stop night sweats    Sexual activity: Yes     Partners: Male     Birth control/protection: Post-Menopausal     Comment:   [place]   Other Topics Concern    Not on file   Social History Narrative    Lives with .     Social Drivers of Health     Financial Resource Strain: Low Risk  (1/29/2025)    Overall Financial Resource Strain (CARDIA)     Difficulty of Paying Living Expenses: Not very hard   Food Insecurity: No Food Insecurity (3/22/2025)    Hunger Vital Sign     Worried About Running Out of Food in the Last Year: Never true     Ran Out of Food in the Last Year: Never true   Transportation Needs: No Transportation Needs (3/22/2025)    PRAPARE - Transportation     Lack of Transportation (Medical): No     Lack of Transportation (Non-Medical): No   Physical Activity: Insufficiently Active (1/29/2025)    Exercise Vital Sign     Days of Exercise per Week: 3 days     Minutes of Exercise per Session: 40 min   Stress: No Stress Concern Present (1/29/2025)    [place] citizen [place] [employer] - Occupational Stress Questionnaire     Feeling of Stress : Only a little   Social Connections: Unknown (1/29/2025)    Social Connection and Isolation Panel  [NHANES]     Frequency of Communication with Friends and Family: Twice a week     Frequency of Social Gatherings with Friends and Family: Patient declined     Attends Congregational Services: Never     Active Member of Clubs or Organizations: No     Attends Club or Organization Meetings: Never     Marital Status:    Intimate Partner Violence: Not At Risk (3/22/2025)    Humiliation, Afraid, Rape, and Kick questionnaire     Fear of Current or Ex-Partner: No     Emotionally Abused: No     Physically Abused: No     Sexually Abused: No   Housing Stability: Low Risk  (3/22/2025)    Housing Stability Vital Sign     Unable to Pay for Housing in the Last Year: No     Number of Times Moved in the Last Year: 0     Homeless in the Last Year: No     Allergies   Allergen Reactions    Metrogel [Metronidazole]      (Medications reviewed.)  Outpatient Encounter Medications as of 5/9/2025   Medication Sig Dispense Refill    estradiol (ESTRACE) 0.1 MG/GM vaginal cream Insert 1 g intravaginally twice weekly. 42.5 g 3    econazole nitrate 1 % cream Apply to affected toenails and surrounding skin 30 g 3    Multiple Vitamin (MULTI-VITAMIN DAILY PO) Take  by mouth.      Ascorbic Acid (VITAMIN C PO) Take  by mouth.      CALCIUM PO Take  by mouth.      doxycycline (VIBRAMYCIN) 100 MG Tab Take 1 Tablet by mouth 2 times a day for 10 days. (Patient not taking: Reported on 5/9/2025) 20 Tablet 0     No facility-administered encounter medications on file as of 5/9/2025.     Review of Systems   Constitutional: Negative.  Negative for chills, fever, malaise/fatigue and weight loss.   HENT: Negative.  Negative for hearing loss.    Eyes: Negative.  Negative for blurred vision and double vision.   Respiratory: Negative.  Negative for cough and shortness of breath.    Cardiovascular: Negative.  Negative for chest pain, palpitations, claudication and leg swelling.   Gastrointestinal: Negative.  Negative for abdominal pain, nausea and vomiting.  "  Genitourinary: Negative.  Negative for dysuria and urgency.   Musculoskeletal: Negative.  Negative for joint pain and myalgias.   Skin: Negative.  Negative for itching and rash.   Neurological: Negative.  Negative for dizziness, focal weakness, weakness and headaches.   Endo/Heme/Allergies: Negative.  Does not bruise/bleed easily.   Psychiatric/Behavioral: Negative.  Negative for depression. The patient is not nervous/anxious.               Objective     /74 (BP Location: Left arm, Patient Position: Sitting, BP Cuff Size: Adult)   Pulse 91   Resp 14   Ht 1.753 m (5' 9\")   Wt 87.5 kg (193 lb)   SpO2 99%   BMI 28.50 kg/m²     Physical Exam  Constitutional:       Appearance: Normal appearance. She is well-developed and normal weight.   HENT:      Head: Normocephalic and atraumatic.   Neck:      Vascular: No JVD.   Cardiovascular:      Rate and Rhythm: Normal rate and regular rhythm.      Heart sounds: Normal heart sounds.   Pulmonary:      Effort: Pulmonary effort is normal.      Breath sounds: Normal breath sounds.   Abdominal:      General: Bowel sounds are normal.      Palpations: Abdomen is soft.      Comments: No hepatosplenomegaly.   Musculoskeletal:         General: Normal range of motion.   Lymphadenopathy:      Cervical: No cervical adenopathy.   Skin:     General: Skin is warm and dry.   Neurological:      Mental Status: She is alert and oriented to person, place, and time.            CARDIAC STUDIES/PROCEDURES:    CT CARDIAC SCORING (03/17/25)  Coronary calcification:  LMA - 0.0  LCX - 0.0  LAD - 0.0  RCA - 0.0  Total Calcium Score: 0.0  Percentile: Calcium score is below the 75th percentile for the patient's age and sex.  IMPRESSION:  1. Coronary Calcium Score of 0:  (study result reviewed)     ECHOCARDIOGRAM CONCLUSIONS (03/24/25)  Normal left ventricular size, wall thickness, systolic, and diastolic function.  Normal right ventricular size and systolic function.  Mild mitral " regurgitation.  Mild tricuspid regurgitation.  No pericardial effusion.  No prior study is available for comparison.   (study result reviewed)    EKG was ordered for bradycardia, performed on (05/09/25) was reviewed: EKG, personally interpreted shows .    Laboratory results of (02/04/25) were reviewed. Cholesterol profile of 246/193/193/151 mg/dL noted.     Assessment & Plan     1. Bradycardia        2. Dyslipidemia            Medical Decision Making: Today's Assessment/Status/Plan:        Bradycardia: She is a 55 year old female with dyslipidemia. Bradycardia was noted on her recent EKG possibly related to vasovagal phenomenon. No further  evaluation warranted at this time.   Hyperlipidemia: Most recent labs demonstrates high cholesterol levels.  Greater than 45 minutes of time was spent to review all above information; of which more than 50% of the time was face to face reviewing thee patient's medical issues, medication evaluation, study result review, lab results review, hospital records.    We will see the patient as needed.    Thank you for this consult.    CC Ángela Elizalde Dr.

## 2025-05-27 ENCOUNTER — HOSPITAL ENCOUNTER (OUTPATIENT)
Facility: MEDICAL CENTER | Age: 55
End: 2025-05-27
Payer: OTHER GOVERNMENT

## 2025-05-27 ENCOUNTER — OFFICE VISIT (OUTPATIENT)
Dept: OBGYN | Facility: CLINIC | Age: 55
End: 2025-05-27
Payer: OTHER GOVERNMENT

## 2025-05-27 DIAGNOSIS — Z12.4 ROUTINE CERVICAL SMEAR: ICD-10-CM

## 2025-05-27 DIAGNOSIS — N94.9 VAGINAL DISCOMFORT: ICD-10-CM

## 2025-05-27 DIAGNOSIS — N89.8 VAGINAL ITCHING: ICD-10-CM

## 2025-05-27 DIAGNOSIS — Z01.419 WELL WOMAN EXAM: Primary | ICD-10-CM

## 2025-05-27 DIAGNOSIS — N95.1 VAGINAL DRYNESS, MENOPAUSAL: ICD-10-CM

## 2025-05-27 DIAGNOSIS — Z01.419 WELL WOMAN EXAM: ICD-10-CM

## 2025-05-27 PROCEDURE — 87480 CANDIDA DNA DIR PROBE: CPT

## 2025-05-27 PROCEDURE — 87660 TRICHOMONAS VAGIN DIR PROBE: CPT

## 2025-05-27 PROCEDURE — 88142 CYTOPATH C/V THIN LAYER: CPT

## 2025-05-27 PROCEDURE — 87510 GARDNER VAG DNA DIR PROBE: CPT

## 2025-05-27 RX ORDER — ESTRADIOL 0.03 MG/D
1 FILM, EXTENDED RELEASE TRANSDERMAL
Qty: 8 PATCH | Refills: 1 | Status: SHIPPED | OUTPATIENT
Start: 2025-05-27

## 2025-05-27 NOTE — PROGRESS NOTES
"Patient is here for annual well woman exam  Pt had a total hysterectomy in 2012.  Pt c/o vaginal itching and dryness as well as the feeling as if something is coming out.   She is using vaginal estrogen cream and says that sometimes it is hard to insert the applicator as if there is \"a bulge\" in the way.  Last Mammogram:4/22/2025 - Benign  Sexually Active: Yes - sometimes pain with intercourse due to dryness - pt asking if she should using the vaginal cream before or after intercourse  Birth Control Method: Hysetectomy - 2012  Phone: [phone]  Pharmacy: [facility]  Allergies and medications confirmed with pt  "

## 2025-05-27 NOTE — PROGRESS NOTES
ANNUAL GYNECOLOGY VISIT    Chief Complaint  Annual Exam    Subjective  Elva Asher is a 55 y.o. female  No LMP recorded. Patient has had a hysterectomy. on menopausal  who presents today for Annual Exam. Pt complains of vaginal dryness and itching. She was treated for BV and yeast infection in April she was treated for this. She has pain with intercourse due to her vaginal dryness. She was prescribed vaginal estrogen for this earlier this year. She feels a fullness to her vaginal like something is falling out of her or like she has a full tampon in. She had a total hysterectomy in . She was on HRT and stopped taking it but she would like to try to get back on it.     Preventive Care   Immunization History   Administered Date(s) Administered    Influenza Vaccine Quad Inj (Pf) 2020, 10/07/2021    PFIZER PURPLE CAP SARS-COV-2 VACCINATION (12+) 04/15/2021, 2021, 2021    Pneumococcal Conjugate Vaccine (PCV20) 2025    Tdap Vaccine 2022    Zoster Vaccine Recombinant (RZV) (SHINGRIX) 2022, 2022     Last Pap: 2012  Any abnormal pap smears?  yes - many years ago   Last Mammogram: 2025 - Benign   Last Colonoscopy: at 52 benign   Last DEXA: n/a      Gynecology History  Current Sexual Activity: yes -    History of sexually transmitted diseases? no  Abnormal discharge? no  Menopause: yes - hysterectomy in   Postmenopausal bleeding: no    Menstrual History  No LMP recorded. Patient has had a hysterectomy.    Contraception  Current: hysterectomy       Cancer Risk Assessement:  Family history of:   - Breast cancer: no    - Ovarian cancer: no   - Uterine cancer: no   - Colon cancer: no    Obstetric History  OB History    Para Term  AB Living   2 2 2   2   SAB IAB Ectopic Molar Multiple Live Births        2      # Outcome Date GA Lbr Terrell/2nd Weight Sex Type Anes PTL Lv   2 Term 99 38w0d  8 lb 1 oz M Vag-Spont  N YA   1 Term 97 37w0d   7 lb 11 oz M Vag-Spont  N YA       Past Medical History  Past Medical History[1]    Past Surgical History  Past Surgical History[2]    Social History  Social History[3]     Family History  Family History   Problem Relation Age of Onset    Hyperlipidemia Mother     Cancer Mother         melanoma    Diabetes Father     Other Father         hepatitis c    Heart Disease Maternal Grandmother     Hypertension Maternal Grandmother     Cancer Other         great uncle had melanoma    Stroke Neg Hx     Ovarian Cancer Neg Hx     Tubal Cancer Neg Hx     Peritoneal Cancer Neg Hx     Colorectal Cancer Neg Hx     Breast Cancer Neg Hx     Heart Attack Neg Hx        Home Medications  Current Outpatient Medications   Medication Sig    estradiol (ESTRACE) 0.1 MG/GM vaginal cream Insert 1 g intravaginally twice weekly.    econazole nitrate 1 % cream Apply to affected toenails and surrounding skin    Multiple Vitamin (MULTI-VITAMIN DAILY PO) Take  by mouth.    Ascorbic Acid (VITAMIN C PO) Take  by mouth.    CALCIUM PO Take  by mouth.       Allergies/Reactions  Allergies[4]    ROS  Review of Systems:  Gen: no fevers or chills, no significant weight loss or gain  Respiratory:  no cough or dyspnea  Cardiac:  no chest pain, no palpitations, no syncope  GI:  no heartburn, no abdominal pain, no nausea or vomiting  Psych: no depression or anxiety    Objective  There were no vitals taken for this visit.    Constitutional: The patient is well developed and well nourished.  Psychiatric: Patient is oriented to time place and person.   Skin: No rash observed.  Neck: Appears symmetric. Thyroid normal size  Respiratory: normal effort  Breast: Inspection reveals no asymetry or nipple discharge, no skin thickening, dimpling or erythema.  Palpation demonstrates no masses.  Abdomen: Soft, non-tender.  Pelvic Exam:      Vulva: external female genitalia are normal in appearance. No lesions     Urethra - no lesions, no erythema     Vagina: moist, pink,  normal ruggae     Ovaries: non-tender, no appreciable masses   Pap Smear performed: No   Chaperone Present: Raven Ramires MA   Extremities: Legs are symmetric and without tenderness. There is no edema present.      Assessment & Plan  Elva Asher is a 55 y.o. female who presents today for Annual Gyn Exam.      1. Health Maintenance   - Cervical cancer screening:       Pap: Collected today  - STI Screen (HIV, Syphilis, Chlamydia / Gonorrhea): declined  - MAMMOGRAM: Up-to-date   - COLONOSCOPY: Up-to-date  - DEXA: Not indicated   - IMMUNIZATIONS: all desired utd  ; Recommended Flu and vaccine each season and COVID boosters when indicated.  - TOBACCO: denies   - DIABETES SCREEN: managed by PCP  - CHOLESTEROL SCREEN: managed by PCP  - COUNSELING: breast self exam, mammography screening, use and side effects of HRT, menopause, osteoporosis, and adequate intake of calcium and vitamin D    2. Well woman exam (Primary)    - THINPREP PAP WITH HPV; collected  - Referral to Physical Therapy  - Estradiol 0.025 MG/24HR PATCH BIWEEKLY; Place 1 Patch on the skin two times a week.  Dispense: 8 Patch; Refill: 1    3. Routine cervical smear  Pap smear of vaginal cuff. Pt had total hysterectomy in 2012. Had a history of abnormal pap smear many years ago  - THINPREP PAP WITH HPV; collected    4. Vaginal itching    - VAGINAL PATHOGENS DNA PANEL; collected    5. Vaginal dryness, menopausal      6. Vaginal discomfort  Discussed with patient that we could refer to Uro/gyn if pelvic floor therapy doesn't work  - Referral to Physical Therapy        Return: Annually or PRN    CHRISTIAN Torres  [facility]   5/27/2025         [1]   Past Medical History:  Diagnosis Date    Hyperlipidemia     Pancreatitis due to biliary obstruction    [2]   Past Surgical History:  Procedure Laterality Date    AL ERCP,DIAGNOSTIC N/A 3/23/2025    Procedure: ERCP (ENDOSCOPIC RETROGRADE CHOLANGIOPANCREATOGRAPHY);  Surgeon: Wiliam Lundberg M.D.;   Location: SURGERY [facility];  Service: Gastroenterology    [place] ERCP,W/REMOVAL STONE,FAISAL/PANCR DUCTS N/A 3/23/2025    Procedure: ERCP, WITH CALCULUS REMOVAL;  Surgeon: Wiliam Lundberg M.D.;  Location: SURGERY [facility];  Service: Gastroenterology    SPHINCTEROTOMY N/A 3/23/2025    Procedure: SPHINCTEROTOMY;  Surgeon: Wiliam Lundberg M.D.;  Location: SURGERY [facility];  Service: Gastroenterology    ABDOMINAL HYSTERECTOMY TOTAL      CHOLECYSTECTOMY      OOPHORECTOMY Right    [3]   Social History  Tobacco Use    Smoking status: Never    Smokeless tobacco: Never   Vaping Use    Vaping status: Former    Substances: THC   Substance Use Topics    Alcohol use: Yes     Comment: Socially    Drug use: Not Currently     Frequency: 2.0 times per week     Types: Marijuana, Inhaled     Comment: Help sleep and stop night sweats   [4]   Allergies  Allergen Reactions    Metrogel [Metronidazole]

## 2025-05-31 DIAGNOSIS — N94.9 VAGINAL DISCOMFORT: Primary | ICD-10-CM

## 2025-06-02 ENCOUNTER — APPOINTMENT (OUTPATIENT)
Dept: MEDICAL GROUP | Facility: PHYSICIAN GROUP | Age: 55
End: 2025-06-02
Payer: OTHER GOVERNMENT

## 2025-06-03 ENCOUNTER — OFFICE VISIT (OUTPATIENT)
Dept: ENDOCRINOLOGY | Facility: MEDICAL CENTER | Age: 55
End: 2025-06-03
Attending: INTERNAL MEDICINE
Payer: OTHER GOVERNMENT

## 2025-06-03 VITALS
HEART RATE: 64 BPM | OXYGEN SATURATION: 99 % | SYSTOLIC BLOOD PRESSURE: 120 MMHG | HEIGHT: 69 IN | DIASTOLIC BLOOD PRESSURE: 70 MMHG | BODY MASS INDEX: 28.73 KG/M2 | WEIGHT: 194 LBS

## 2025-06-03 DIAGNOSIS — R79.89 LOW SERUM CORTISOL LEVEL: Primary | ICD-10-CM

## 2025-06-03 DIAGNOSIS — R00.1 SINUS BRADYCARDIA: ICD-10-CM

## 2025-06-03 PROCEDURE — 99213 OFFICE O/P EST LOW 20 MIN: CPT | Performed by: INTERNAL MEDICINE

## 2025-06-03 PROCEDURE — 99203 OFFICE O/P NEW LOW 30 MIN: CPT | Performed by: INTERNAL MEDICINE

## 2025-06-03 PROCEDURE — 3078F DIAST BP <80 MM HG: CPT | Performed by: INTERNAL MEDICINE

## 2025-06-03 PROCEDURE — 3074F SYST BP LT 130 MM HG: CPT | Performed by: INTERNAL MEDICINE

## 2025-06-03 ASSESSMENT — FIBROSIS 4 INDEX: FIB4 SCORE: 1.24

## 2025-06-03 NOTE — PROGRESS NOTES
Chief Complaint: Consult requested by Ángela Kilgore M.D. for evaluation of abnormal cortisol level    HPI:     Elva Asher is a 55 y.o. female with hx of gallstone pancreatitis with previous cholecystectomy.   She was admitted on 3/22/2025 for concerns for acute pancreatitis versus ascending cholangitis.   Underwent ERCP with choledocholithiasis extracted after biliary sphincterotomy. Blood cultures negative x3 days. AST/ALT downtrended. Patient developed bradycardia. EKG showed sinus bradycardia without signs of heart block. TSH normal.   She was discharged on hydrocortisone by the hospitalist team and was also referred to cardiology because of her bradycardia.  She was referred to endocrinology because of her low cortisol levels        It is notable that  She had ERCP on 3/23/2025      Cortisol was low at 1.8 on 3/24/2025  TSH is 0.577      Upon review of records I discovered that she was given dexamethasone on 3/23/2025 by Anesthesia during her ERCP which explains why her cortisol was low on 3/24/2025            She was after her hospitalization discharged on HC 20/10mg which she finished as the treatment duration was limited    Her PCP checked her AM cortisol on 4/2/12025 - 7 days after finishing hydrocortisone  And her AM cortisol was normal at 20.5 ruling out adrenal insufficiency              Patient's medications, allergies, and social histories were reviewed and updated as appropriate.      ROS:     CONS:     No fever, no chills, no weight loss, no fatigue   EYES:      No diplopia, no blurry vision, no redness of eyes, no swelling of eyelids   ENT:    No hearing loss, No ear pain, No sore throat, no dysphagia, no neck swelling   CV:     No chest pain, no palpitations, no claudication, no orthopnea, no PND   PULM:    No SOB, no cough, no hemoptysis, no wheezing    GI:   No nausea, no vomiting, no diarrhea, no constipation, no bloody stools   :  Passing urine well, no dysuria, no hematuria   ENDO:    No polyuria, no polydipsia, no heat intolerance, no cold intolerance   NEURO: No headaches, no dizziness, no convulsions, no tremors   MUSC:  No joint swellings, no arthralgias, no myalgias, no weakness   SKIN:   No rash, no ulcers, no dry skin   PSYCH:   No depression, no anxiety, no difficulty sleeping       Past Medical History:  Patient Active Problem List    Diagnosis Date Noted    Adrenal insufficiency (HCC) 03/24/2025    Bradycardia 03/24/2025    Hypokalemia 03/23/2025    Ascending cholangitis 03/22/2025    Acute gallstone pancreatitis 03/21/2025    Leukocytosis 03/21/2025    Elevated LFTs 03/21/2025    Atrophic vaginitis 03/18/2025    Family history of melanoma 03/18/2025    Dyslipidemia 01/30/2025    Vitamin D deficiency 01/30/2025    H/O: hysterectomy 11/19/2021    Hot flashes 11/19/2021       Past Surgical History:  Past Surgical History[1]     Allergies:  Metrogel [metronidazole]     Current Medications:  Current Medications[2]    Social History:  Social History     Socioeconomic History    Marital status:      Spouse name: Not on file    Number of children: 2    Years of education: Not on file    Highest education level: Bachelor's degree (e.g., BA, AB, BS)   Occupational History     Employer: NOT EMPLOYED   Tobacco Use    Smoking status: Never    Smokeless tobacco: Never   Vaping Use    Vaping status: Former    Substances: THC   Substance and Sexual Activity    Alcohol use: Yes     Comment: Socially    Drug use: Not Currently     Frequency: 2.0 times per week     Types: Marijuana, Inhaled     Comment: Help sleep and stop night sweats    Sexual activity: Yes     Partners: Male     Birth control/protection: Post-Menopausal, Surgical     Comment:   [place]   Other Topics Concern    Not on file   Social History Narrative    Lives with .     Social Drivers of Health     Financial Resource Strain: Low Risk  (1/29/2025)    Overall Financial Resource Strain (CARDIA)      Difficulty of Paying Living Expenses: Not very hard   Food Insecurity: No Food Insecurity (3/22/2025)    Hunger Vital Sign     Worried About Running Out of Food in the Last Year: Never true     Ran Out of Food in the Last Year: Never true   Transportation Needs: No Transportation Needs (3/22/2025)    PRAPARE - Transportation     Lack of Transportation (Medical): No     Lack of Transportation (Non-Medical): No   Physical Activity: Insufficiently Active (1/29/2025)    Exercise Vital Sign     Days of Exercise per Week: 3 days     Minutes of Exercise per Session: 40 min   Stress: No Stress Concern Present (1/29/2025)    [employer] [place] [employer] - Occupational Stress Questionnaire     Feeling of Stress : Only a little   Social Connections: Unknown (1/29/2025)    Social Connection and Isolation Panel [NHANES]     Frequency of Communication with Friends and Family: Twice a week     Frequency of Social Gatherings with Friends and Family: Patient declined     Attends [employer]ism Services: Never     Active Member of Clubs or Organizations: No     Attends Club or Organization Meetings: Never     Marital Status:    Intimate Partner Violence: Not At Risk (3/22/2025)    Humiliation, Afraid, Rape, and Kick questionnaire     Fear of Current or Ex-Partner: No     Emotionally Abused: No     Physically Abused: No     Sexually Abused: No   Housing Stability: Low Risk  (3/22/2025)    Housing Stability Vital Sign     Unable to Pay for Housing in the Last Year: No     Number of Times Moved in the Last Year: 0     Homeless in the Last Year: No        Family History:   Family History   Problem Relation Age of Onset    Hyperlipidemia Mother     Cancer Mother         melanoma    Diabetes Father     Other Father         hepatitis c    Heart Disease Maternal Grandmother     Hypertension Maternal Grandmother     Cancer Other         great uncle had melanoma    Stroke Neg Hx     Ovarian Cancer Neg Hx     Tubal Cancer Neg Hx   "   Peritoneal Cancer Neg Hx     Colorectal Cancer Neg Hx     Breast Cancer Neg Hx     Heart Attack Neg Hx          PHYSICAL EXAM:   Vital signs: /70 (BP Location: Left arm, Patient Position: Sitting, BP Cuff Size: Adult long)   Pulse 64   Ht 1.753 m (5' 9\")   Wt 88 kg (194 lb)   SpO2 99%   BMI 28.65 kg/m²   GENERAL: Well-developed, well-nourished  in no apparent distress.   EYE: No ocular and eyelid asymmetry, Anicteric sclerae,  PERRL  HENT: Hearing grossly intact, Normocephalic, atraumatic. Pink, moist mucous membranes, No exudate  NECK: Supple. Trachea midline. thyroid is normal in size without nodules or tenderness  CARDIOVASCULAR: Regular rate and rhythm. No murmurs, rubs, or gallops.   LUNGS: Clear to auscultation bilaterally   ABDOMEN: Soft, nontender with positive bowel sounds.   EXTREMITIES: No clubbing, cyanosis, or edema.   NEUROLOGICAL: Cranial nerves II-XII are grossly intact   Symmetric reflexes at the patella no proximal muscle weakness  LYMPH: No cervical, supraclavicular,  adenopathy palpated.   SKIN: No rashes, lesions. Turgor is normal.    Labs:  Lab Results   Component Value Date/Time    WBC 5.2 03/24/2025 12:33 AM    RBC 3.65 (L) 03/24/2025 12:33 AM    HEMOGLOBIN 11.4 (L) 03/24/2025 12:33 AM    MCV 93.7 03/24/2025 12:33 AM    MCH 31.2 03/24/2025 12:33 AM    MCHC 33.3 03/24/2025 12:33 AM    RDW 41.4 03/24/2025 12:33 AM    MPV 10.9 03/24/2025 12:33 AM       Lab Results   Component Value Date/Time    SODIUM 145 03/25/2025 01:36 AM    POTASSIUM 3.5 (L) 03/25/2025 01:36 AM    CHLORIDE 109 03/25/2025 01:36 AM    CO2 24 03/25/2025 01:36 AM    ANION 12.0 03/25/2025 01:36 AM    GLUCOSE 123 (H) 03/25/2025 01:36 AM    BUN 9 03/25/2025 01:36 AM    CREATININE 0.69 03/25/2025 01:36 AM    CALCIUM 8.6 03/25/2025 01:36 AM    ASTSGOT 45 03/25/2025 01:36 AM    ALTSGPT 193 (H) 03/25/2025 01:36 AM    TBILIRUBIN 0.3 03/25/2025 01:36 AM    ALBUMIN 3.8 03/25/2025 01:36 AM    TOTPROTEIN 6.3 03/25/2025 01:36 " "AM    GLOBULIN 2.5 03/25/2025 01:36 AM    AGRATIO 1.5 03/25/2025 01:36 AM       Lab Results   Component Value Date/Time    CHOLSTRLTOT 246 (H) 02/04/2025 0822    TRIGLYCERIDE 193 (H) 02/04/2025 0822    HDL 56 02/04/2025 0822     (H) 02/04/2025 0822       Lab Results   Component Value Date/Time    TSHULTRASEN 0.577 03/24/2025 0033     No results found for: \"FREET4\"  No results found for: \"FREET3\"  No results found for: \"THYSTIMIG\"    No results found for: \"MICROSOMALA\"      Imaging:      ASSESSMENT/PLAN:     1. Low serum cortisol level  Resolved   She does not have adrenal insufficiency  Upon further investigation I discovered that she had received dexamethasone injection from anesthesia on March 23, 2025 which explains why her cortisol was low on March 24, 2025    Off hydrocortisone and before she had started her estrogen patch as her morning cortisol level was normal at 20 on April 21, 2025 ruling out adrenal insufficiency    There is no indication for further follow-up with endocrinology  Follow-up with primary care    2. Sinus bradycardia  Stable  She no longer has sinus bradycardia episodes except when she is sleeping and I explained to her that this is physiologic.  Continue follow-up with primary care        Return if symptoms worsen or fail to improve.         Total time spent on day of service was over 60 minutes which included obtaining a detailed history and physical exam, ordering labs, coordinating care and scheduling future follow-up    Thank you kindly for allowing me to participate in the thyroid care plan for this patient.    Jayme Chaidez MD, [facility], [facility]  06/03/25    CC:   Ángela Kilgore M.D.         [1]   Past Surgical History:  Procedure Laterality Date    OH ERCP,DIAGNOSTIC N/A 3/23/2025    Procedure: ERCP (ENDOSCOPIC RETROGRADE CHOLANGIOPANCREATOGRAPHY);  Surgeon: Wiliam Lundberg M.D.;  Location: SURGERY [facility];  Service: Gastroenterology    OH ERCP,W/REMOVAL STONE,FAISAL/PANCR " DUCTS N/A 3/23/2025    Procedure: ERCP, WITH CALCULUS REMOVAL;  Surgeon: Wiliam Lundberg M.D.;  Location: SURGERY [facility];  Service: Gastroenterology    SPHINCTEROTOMY N/A 3/23/2025    Procedure: SPHINCTEROTOMY;  Surgeon: Wiliam Lundberg M.D.;  Location: SURGERY [facility];  Service: Gastroenterology    ABDOMINAL HYSTERECTOMY TOTAL      CHOLECYSTECTOMY      OOPHORECTOMY Right    [2]   Current Outpatient Medications:     Estradiol 0.025 MG/24HR PATCH BIWEEKLY, Place 1 Patch on the skin two times a week., Disp: 8 Patch, Rfl: 1    estradiol (ESTRACE) 0.1 MG/GM vaginal cream, Insert 1 g intravaginally twice weekly., Disp: 42.5 g, Rfl: 3    econazole nitrate 1 % cream, Apply to affected toenails and surrounding skin, Disp: 30 g, Rfl: 3    Multiple Vitamin (MULTI-VITAMIN DAILY PO), Take  by mouth., Disp: , Rfl:     Ascorbic Acid (VITAMIN C PO), Take  by mouth., Disp: , Rfl:     CALCIUM PO, Take  by mouth., Disp: , Rfl:

## 2025-06-03 NOTE — Clinical Note
REFERRAL APPROVAL NOTICE         Sent on Pauline 3, 2025                   Elva Asher  [address] [address]                   Dear Ms. Asher,    After a careful review of the medical information and benefit coverage, [facility] has processed your referral. See below for additional details.    If applicable, you must be actively enrolled with your insurance for coverage of the authorized service. If you have any questions regarding your coverage, please contact your insurance directly.    REFERRAL INFORMATION   Referral #:  [id number]  Referred-To Department    Referred-By Provider:  Physical Therapy    Ángela Kilgore M.D.   Phys Therapy [address]      [address] [facility] [address] [phone] [address]  [address]  [facility] [id number] [phone]    Referral Start Date:  05/31/2025  Referral End Date:   08/31/2025             SCHEDULING  If you do not already have an appointment, please call [phone] to make an appointment.     MORE INFORMATION  If you do not already have a [employer] account, sign up at: [email or web address]  You can access your medical information, make appointments, see lab results, billing information, and more.  If you have questions regarding this referral, please contact  the [facility] Referrals department at:             [phone]. Monday - Friday 8:00AM - 5:00PM.     Sincerely,    [facility]

## 2025-06-04 LAB
HPV I/H RISK 1 DNA SPEC QL NAA+PROBE: NOT DETECTED
SPECIMEN SOURCE: NORMAL
THINPREP PAP, CYTOLOGY NL11781: NORMAL

## 2025-06-05 ENCOUNTER — RESULTS FOLLOW-UP (OUTPATIENT)
Dept: OBGYN | Facility: CLINIC | Age: 55
End: 2025-06-05
Payer: OTHER GOVERNMENT

## 2025-06-10 DIAGNOSIS — N94.9 VAGINAL DISCOMFORT: Primary | ICD-10-CM

## 2025-06-18 NOTE — Clinical Note
REFERRAL APPROVAL NOTICE         Sent on June 18, 2025                   Elva Asher  [address] [address]                   Dear Ms. Asher,    After a careful review of the medical information and benefit coverage, [facility] has processed your referral. See below for additional details.    If applicable, you must be actively enrolled with your insurance for coverage of the authorized service. If you have any questions regarding your coverage, please contact your insurance directly.    REFERRAL INFORMATION   Referral #:  [id number]  Referred-To Department    Referred-By Provider:  Occupational Therapy    Ángela Kilgore M.D.   Occup Therapy [address]      [address] [facility] [id number] [phone] [address]  [address]  [facility] [address] [phone]    Referral Start Date:  06/10/2025  Referral End Date:   06/10/2026             SCHEDULING  If you do not already have an appointment, please call [phone] to make an appointment.     MORE INFORMATION  If you do not already have a [employer] account, sign up at: [email or web address]  You can access your medical information, make appointments, see lab results, billing information, and more.  If you have questions regarding this referral, please contact  the [facility] Referrals department at:             [phone]. Monday - Friday 8:00AM - 5:00PM.     Sincerely,    [facility]

## 2025-06-27 DIAGNOSIS — Z01.419 WELL WOMAN EXAM: ICD-10-CM

## 2025-06-27 RX ORDER — ESTRADIOL 0.03 MG/D
1 FILM, EXTENDED RELEASE TRANSDERMAL
Qty: 8 PATCH | Refills: 1 | Status: SHIPPED | OUTPATIENT
Start: 2025-06-27

## 2025-07-30 ENCOUNTER — GYNECOLOGY VISIT (OUTPATIENT)
Dept: OBGYN | Facility: CLINIC | Age: 55
End: 2025-07-30
Payer: OTHER GOVERNMENT

## 2025-07-30 VITALS — DIASTOLIC BLOOD PRESSURE: 72 MMHG | SYSTOLIC BLOOD PRESSURE: 120 MMHG | BODY MASS INDEX: 28.65 KG/M2 | WEIGHT: 194 LBS

## 2025-07-30 DIAGNOSIS — N95.2 ATROPHIC VAGINITIS: ICD-10-CM

## 2025-07-30 DIAGNOSIS — R68.82 DECREASED LIBIDO: ICD-10-CM

## 2025-07-30 DIAGNOSIS — Z79.890 HORMONE REPLACEMENT THERAPY: ICD-10-CM

## 2025-07-30 DIAGNOSIS — N94.9 VAGINAL DISCOMFORT: Primary | ICD-10-CM

## 2025-07-30 RX ORDER — TESTOSTERONE GEL, 1% 10 MG/G
GEL TRANSDERMAL
Qty: 150 G | Refills: 1 | Status: SHIPPED | OUTPATIENT
Start: 2025-07-30 | End: 2026-01-29

## 2025-07-30 RX ORDER — ESTRADIOL 0.03 MG/D
1 FILM, EXTENDED RELEASE TRANSDERMAL
Qty: 8 PATCH | Refills: 3 | Status: SHIPPED | OUTPATIENT
Start: 2025-07-30

## 2025-07-30 ASSESSMENT — FIBROSIS 4 INDEX: FIB4 SCORE: 1.24

## 2025-07-30 NOTE — PROGRESS NOTES
GYN PROBLEM VISIT    CC:  No chief complaint on file.       HPI: Patient is a 55 y.o.  No LMP recorded. Patient has had a hysterectomy.  She is here for a follow up on starting HRT. She is feeling much better on the estradiol patches. She has a hx of a hysterectomy so she does not need to be on progesterone. She is having some relief of vaginal dryness with the estradiol cream but she would like to try the vaginal DHEA cream to see if that will help better. She is also having a decrease in libido and would like to try testosterone cream.     She has a referral for pelvic floor therapy. The PT with [facility] is booked out until December. She would like to get in sooner with a different place. She called [facility] and they have appts available sooner.      ROS:   General: denies fever / chills  HEENT: denies sore throat:  CV: denies chest pain:  Repiratory: denies shortness of breath  GI: denies abdominal pain  : denies dysuria:    PFSH:  I personally reviewed the past medical and surgical histories.     Social History[1]     Social History     Substance and Sexual Activity   Sexual Activity Yes    Partners: Male    Birth control/protection: Post-Menopausal, Surgical    Comment:   [place]        ALLERGIES / REACTIONS:  Allergies[2]                        PHYSICAL EXAMINATION:  Vital Signs:   /72 (BP Location: Left arm, Patient Position: Sitting, BP Cuff Size: Adult)   Wt 194 lb   BMI 28.65 kg/m²     Gen: appears well, NAD  Respiratory: normal effort  Abdomen: Soft, non-tender.  Pelvic Exam: declined    ASSESSMENT AND PLAN:  55 y.o.      1. Vaginal discomfort (Primary)  Referral to [facility] so she can get in sooner.   - Referral to Physical Therapy    2. Atrophic vaginitis    - DHEA SULFATE; Future    3. Hormone replacement therapy    - Estradiol 0.025 MG/24HR PATCH BIWEEKLY; Place 1 Patch on the skin two times a week.  Dispense: 8 Patch; Refill: 3    4.  Decreased libido    - testosterone (TESTIM) 50 MG/5GM (1%) Gel gel; Place 20mg on the skin everyday  Dispense: 150 g; Refill: 1        Follow up in 1 year    CHRISTIAN Torres           [1]   Social History  Tobacco Use    Smoking status: Never    Smokeless tobacco: Never   Vaping Use    Vaping status: Former    Substances: THC   Substance Use Topics    Alcohol use: Yes     Comment: Socially    Drug use: Not Currently     Frequency: 2.0 times per week     Types: Marijuana, Inhaled     Comment: Help sleep and stop night sweats   [2]   Allergies  Allergen Reactions    Metrogel [Metronidazole]

## 2025-07-30 NOTE — PROGRESS NOTES
Pt here for follow up  estradiol patches. Pt states needs refill on patches   Pt states estradiol patches are working good and has some questions the new menopause book

## 2025-07-31 ENCOUNTER — HOSPITAL ENCOUNTER (OUTPATIENT)
Dept: LAB | Facility: MEDICAL CENTER | Age: 55
End: 2025-07-31
Payer: OTHER GOVERNMENT

## 2025-07-31 DIAGNOSIS — N94.9 VAGINAL DISCOMFORT: Primary | ICD-10-CM

## 2025-07-31 DIAGNOSIS — N95.2 ATROPHIC VAGINITIS: ICD-10-CM

## 2025-07-31 LAB — DHEA-S SERPL-MCNC: 20 UG/DL (ref 35.4–256)

## 2025-07-31 PROCEDURE — 36415 COLL VENOUS BLD VENIPUNCTURE: CPT

## 2025-07-31 PROCEDURE — 82627 DEHYDROEPIANDROSTERONE: CPT

## 2025-08-19 ENCOUNTER — PATIENT MESSAGE (OUTPATIENT)
Dept: OBGYN | Facility: CLINIC | Age: 55
End: 2025-08-19
Payer: OTHER GOVERNMENT

## 2025-08-19 DIAGNOSIS — N94.9 VAGINAL DISCOMFORT: Primary | ICD-10-CM

## 2025-08-29 ENCOUNTER — HOSPITAL ENCOUNTER (OUTPATIENT)
Facility: MEDICAL CENTER | Age: 55
End: 2025-08-29
Attending: STUDENT IN AN ORGANIZED HEALTH CARE EDUCATION/TRAINING PROGRAM
Payer: OTHER GOVERNMENT

## 2025-08-29 ENCOUNTER — OFFICE VISIT (OUTPATIENT)
Dept: MEDICAL GROUP | Facility: PHYSICIAN GROUP | Age: 55
End: 2025-08-29
Payer: OTHER GOVERNMENT

## 2025-08-29 VITALS
BODY MASS INDEX: 28.5 KG/M2 | HEART RATE: 73 BPM | SYSTOLIC BLOOD PRESSURE: 114 MMHG | TEMPERATURE: 97.3 F | HEIGHT: 69 IN | OXYGEN SATURATION: 97 % | WEIGHT: 192.4 LBS | DIASTOLIC BLOOD PRESSURE: 62 MMHG

## 2025-08-29 DIAGNOSIS — N94.9 VAGINAL DISCOMFORT: ICD-10-CM

## 2025-08-29 DIAGNOSIS — Z79.890 HORMONE REPLACEMENT THERAPY (HRT): ICD-10-CM

## 2025-08-29 DIAGNOSIS — E55.9 VITAMIN D DEFICIENCY: ICD-10-CM

## 2025-08-29 DIAGNOSIS — E78.5 DYSLIPIDEMIA: Primary | ICD-10-CM

## 2025-08-29 DIAGNOSIS — B35.1 ONYCHOMYCOSIS: ICD-10-CM

## 2025-08-29 DIAGNOSIS — E66.3 OVERWEIGHT: ICD-10-CM

## 2025-08-29 PROCEDURE — 87660 TRICHOMONAS VAGIN DIR PROBE: CPT

## 2025-08-29 PROCEDURE — 87510 GARDNER VAG DNA DIR PROBE: CPT

## 2025-08-29 PROCEDURE — 87480 CANDIDA DNA DIR PROBE: CPT

## 2025-08-29 ASSESSMENT — FIBROSIS 4 INDEX: FIB4 SCORE: 1.24

## 2025-08-30 DIAGNOSIS — N94.9 VAGINAL DISCOMFORT: ICD-10-CM

## 2025-08-30 DIAGNOSIS — B96.89 BV (BACTERIAL VAGINOSIS): Primary | ICD-10-CM

## 2025-08-30 DIAGNOSIS — N76.0 BV (BACTERIAL VAGINOSIS): Primary | ICD-10-CM

## 2025-08-30 PROBLEM — E27.40 ADRENAL INSUFFICIENCY (HCC): Status: RESOLVED | Noted: 2025-03-24 | Resolved: 2025-08-30

## 2025-08-30 PROBLEM — Z79.890 HORMONE REPLACEMENT THERAPY (HRT): Status: ACTIVE | Noted: 2025-08-30

## 2025-08-30 LAB
CANDIDA DNA VAG QL PROBE+SIG AMP: NEGATIVE
G VAGINALIS DNA VAG QL PROBE+SIG AMP: POSITIVE
T VAGINALIS DNA VAG QL PROBE+SIG AMP: NEGATIVE

## 2025-08-30 RX ORDER — CLINDAMYCIN PHOSPHATE 20 MG/G
1 CREAM VAGINAL NIGHTLY
Qty: 40 G | Refills: 0 | Status: SHIPPED | OUTPATIENT
Start: 2025-08-30

## (undated) DEVICE — DEVICE BIOPSY RX BILIARY SYSTEM CAP (10EA/BX)

## (undated) DEVICE — COVER ENDOSCOPE DISTAL SINGLE USE (20EA/BX)

## (undated) DEVICE — SENSOR OXIMETER ADULT SPO2 RD SET (20EA/BX)

## (undated) DEVICE — BUTTON ENDOSCOPY DISPOSABLE

## (undated) DEVICE — NEPTUNE 4 PORT MANIFOLD - (20/PK)

## (undated) DEVICE — BLOCK BITE MAXI DENTAL RETENTION RIM (100EA/BX)

## (undated) DEVICE — KIT CUSTOM PROCEDURE SINGLE FOR ENDO (15/CA)

## (undated) DEVICE — TUBE E-T HI-LO CUFF 7.0MM (10EA/PK)

## (undated) DEVICE — SYRINGE 50 ML LL (40EA/BX 4BX/CA)

## (undated) DEVICE — SET LEADWIRE 5 LEAD BEDSIDE DISPOSABLE ECG (1SET OF 5/EA)

## (undated) DEVICE — SYRINGE 10 ML CONTROL LL (25EA/BX 4BX/CA)

## (undated) DEVICE — GUIDEWARE ENDOSCOPIC JAGWIRE REVOLUTION RX 39 SPHINCTEROTOME (1EA)

## (undated) DEVICE — PORT AUXILLARY WATER (50EA/BX)

## (undated) DEVICE — MASK OXYGEN VNYL ADLT MED CONC WITH 7 FOOT TUBING - (50EA/CA)

## (undated) DEVICE — ELECTRODE DUAL RETURN W/ CORD - (50/PK)

## (undated) DEVICE — FILM CASSETTE ENDO

## (undated) DEVICE — TUBE CONNECTING SUCTION - CLEAR PLASTIC STERILE 72 IN (50EA/CA)

## (undated) DEVICE — BALLOON RETRIEVAL EXTRACTOR PRO RX 9-12MM

## (undated) DEVICE — ELECTRODE 850 FOAM ADHESIVE - HYDROGEL RADIOTRNSPRNT (50/PK)

## (undated) DEVICE — LACTATED RINGERS INJ 1000 ML - (14EA/CA 60CA/PF)

## (undated) DEVICE — WATER IRRIGATION STERILE 1000ML (12EA/CA)

## (undated) DEVICE — KIT ANESTHESIA W/CIRCUIT & 3/LT BAG W/FILTER (20EA/CA)

## (undated) DEVICE — SODIUM CHL IRRIGATION 0.9% 1000ML (12EA/CA)